# Patient Record
Sex: FEMALE | Race: WHITE | URBAN - METROPOLITAN AREA
[De-identification: names, ages, dates, MRNs, and addresses within clinical notes are randomized per-mention and may not be internally consistent; named-entity substitution may affect disease eponyms.]

---

## 2022-09-06 ENCOUNTER — OFFICE VISIT (OUTPATIENT)
Dept: UROGYNECOLOGY | Age: 47
End: 2022-09-06
Payer: MEDICAID

## 2022-09-06 VITALS
HEART RATE: 78 BPM | DIASTOLIC BLOOD PRESSURE: 84 MMHG | OXYGEN SATURATION: 99 % | RESPIRATION RATE: 16 BRPM | SYSTOLIC BLOOD PRESSURE: 126 MMHG | TEMPERATURE: 98 F

## 2022-09-06 DIAGNOSIS — R35.0 URINARY FREQUENCY: ICD-10-CM

## 2022-09-06 DIAGNOSIS — N39.3 STRESS INCONTINENCE: ICD-10-CM

## 2022-09-06 DIAGNOSIS — R39.15 URINARY URGENCY: ICD-10-CM

## 2022-09-06 DIAGNOSIS — N81.11 CYSTOCELE, MIDLINE: Primary | ICD-10-CM

## 2022-09-06 DIAGNOSIS — N81.9 VAGINAL VAULT PROLAPSE: ICD-10-CM

## 2022-09-06 DIAGNOSIS — N81.6 RECTOCELE: ICD-10-CM

## 2022-09-06 LAB
BILIRUBIN, POC: NORMAL
BLOOD URINE, POC: NORMAL
CLARITY, POC: CLEAR
COLOR, POC: YELLOW
EMPTY COUGH STRESS TEST: POSITIVE
FIRST SENSATION: 50 CC
FULL COUGH STRESS TEST: NORMAL
GLUCOSE URINE, POC: NORMAL
KETONES, POC: NORMAL
LEUKOCYTE EST, POC: NORMAL
MAX SENSATION: 325 CC
NITRATE, URINE POC: NORMAL
NITRITE, POC: NORMAL
PH, POC: 6.5
POST VOID RESIDUAL (PVR): 100 ML
PROTEIN, POC: NORMAL
RBC URINE, POC: NORMAL
SECOND SENSATION: 90 CC
SPASM: NORMAL
SPECIFIC GRAVITY, POC: 1.01
UROBILINOGEN, POC: NORMAL
WBC URINE, POC: NORMAL

## 2022-09-06 PROCEDURE — 99203 OFFICE O/P NEW LOW 30 MIN: CPT | Performed by: OBSTETRICS & GYNECOLOGY

## 2022-09-06 PROCEDURE — 81002 URINALYSIS NONAUTO W/O SCOPE: CPT | Performed by: OBSTETRICS & GYNECOLOGY

## 2022-09-06 PROCEDURE — 51725 SIMPLE CYSTOMETROGRAM: CPT | Performed by: OBSTETRICS & GYNECOLOGY

## 2022-09-06 NOTE — PROGRESS NOTES
9/6/2022      HPI:     Name: Brianna Lyn  YOB: 1975    CC: Patient is a 49 y.o. female who is seen in consultation from [address]   for evaluation of prolapse. HPI:  Bladder control problem: Yes   How many months have you had a bladder problem? 7 years  Do you use pads to absorb lost urine? No.   How many trips do you make to the bathroom during the day? 10  How many times do you wake at night to go to the bathroom? 2-3  Do you ever wet the bed while asleep? Yes  Are there times when you cannot make it to the bathroom on time? Yes  Does sound, sight, or feel of running water cause you to lose urine? No  How many ounces of liquid do you consume daily? 70  How many drinks containing caffeine do you consume daily? 2-3  Which best describes urine loss: (Check all that apply)  [x] I lose urine during coughing, sneezing, running, lifting  [] I lose urine with changes in posture, standing, walking  [] I lose urine continuously such that I am constantly wet  [] I have sudden, urgent needs without the ability to make it to the bathroom  Have you seen a physician for complaints of urine loss? No   Have you taken medication to prevent urine loss? No   Bladder emptying problems:  Yes  How long have you had bladder emptying problems? 2 years  Do you notice any dribbling of urine when you stand after passing urine? Yes  Do you usually have difficulty starting your urine stream? Yes  Do you have to assume abnormal positions to urinate? No   Do you have to strain to empty your bladder? No  Do you feel as if your bladder is empty after passing urine? No  Is your urine flow: intermittent   Prolapse/Vaginal Support problems: Yes   Do you notice a bulge? Yes  How long have you had a protrusion or bulge? 7 years  Are your symptoms worse at the end of the day or after for prolonged periods? Yes  Do you push the protrusion back to help with a bowel movement or to empty your bladder?  Yes  Have you ever used a pessary (plastic support device) for this problem? No     Bowel problem(s): No   Sexual History:  reports that she is not currently sexually active. Pelvic Pain:  Yes  Where is your pain? Lower Abdomen  How long have you had pelvic pain? 2 years  Is your pain relieved by bladder emptying? Yes  Do you have pain with urination? No  Does anything relieve the pain? Yes, n/a       Ob/Gyn History:    OB History    Para Term  AB Living   6 6 6     6   SAB IAB Ectopic Molar Multiple Live Births             6      # Outcome Date GA Lbr Ronnie/2nd Weight Sex Delivery Anes PTL Lv   6 Term      Vag-Spont   PERCY   5 Term      Vag-Spont   PERCY   4 Term      Vag-Spont   PERCY   3 Term      Vag-Spont   PERCY   2 Term      Vag-Spont   PERCY   1 Term      Vag-Spont   PERCY     Past Medical History:   Past Medical History:   Diagnosis Date    PMDD (premenstrual dysphoric disorder)     Seizure ([facility] [address]     X 2-last one as a teenager     Past Surgical History:   Past Surgical History:   Procedure Laterality Date    CERVIX SURGERY      \"freezing\"    HYSTERECTOMY (CERVIX STATUS UNKNOWN)  3-    VAGINAL HYSTERECTOMY, BILATERAL SAPINGECTOMY    LEEP       Allergies: No Known Allergies  Current Medications:  Current Outpatient Medications   Medication Sig Dispense Refill    FLUoxetine (PROZAC) 20 MG capsule Take 20 mg by mouth daily. ibuprofen (ADVIL;MOTRIN) 800 MG tablet Take 1 tablet by mouth every 6 hours. (Patient not taking: Reported on 2022) 60 tablet 1     No current facility-administered medications for this visit.      Social History:   Social History     Socioeconomic History    Marital status:      Spouse name: Not on file    Number of children: Not on file    Years of education: Not on file    Highest education level: Not on file   Occupational History    Not on file   Tobacco Use    Smoking status: Former     Types: Cigarettes     Quit date: 10/19/2013     Years since quittin.8    Smokeless tobacco: Never   Vaping Use    Vaping Use: Never used   Substance and Sexual Activity    Alcohol use: No    Drug use: No    Sexual activity: Not Currently   Other Topics Concern    Not on file   Social History Narrative    Not on file     Social Determinants of Health     Financial Resource Strain: Not on file   Food Insecurity: Not on file   Transportation Needs: Not on file   Physical Activity: Not on file   Stress: Not on file   Social Connections: Not on file   Intimate Partner Violence: Not on file   Housing Stability: Not on file     Family History:   Family History   Problem Relation Age of Onset    Other Father      Review of Systems:  Review of Systems   Genitourinary:  Positive for pelvic pain and vaginal pain. Allergic/Immunologic: Positive for environmental allergies. All other systems reviewed and are negative. Objective:     Vital Signs  Vitals:    09/06/22 1322 09/06/22 1324   BP: (!) 146/89 126/84   Pulse: 78    Resp: 16    Temp: 98 °F (36.7 °C)    SpO2: 99%      Physical Exam  Physical Exam  HENT:      Head: Normocephalic and atraumatic. Eyes:      Conjunctiva/sclera: Conjunctivae normal.   Pulmonary:      Effort: Pulmonary effort is normal.   Abdominal:      Palpations: Abdomen is soft. Genitourinary:     Comments: Very large complete vault prolapse cystocele and enterocele, extending 8 cm beyond the introitus  Musculoskeletal:      Cervical back: Normal range of motion and neck supple. Skin:     General: Skin is warm and dry. Neurological:      Mental Status: She is alert and oriented to person, place, and time. Office Fill Study/Urine Dip:     Using sterile technique a manometry catheter was placed. Patient's bladder was filled with sterile water by gravity. Capacity and storage volumes were measured. Spasms assessed. Catheter was removed. Stress urinary incontinence was assessed.     Results for POC orders placed in visit on 09/06/22   POCT Urinalysis no Micro Result Value Ref Range    Color, UA yellow     Clarity, UA clear     Glucose, UA POC neg     Bilirubin, UA neg     Ketones, UA neg     Spec Grav, UA 1.015     Blood, UA POC neg     pH, UA 6.5     Protein, UA POC neg     Urobilinogen, UA neg     Leukocytes, UA neg     Nitrite, UA neg        Cystometrogram   WBC Urine, POC   Date Value Ref Range Status   09/06/2022 neg  Final     RBC Urine, POC   Date Value Ref Range Status   09/06/2022 neg  Final     Nitrate, UA POC   Date Value Ref Range Status   09/06/2022 neg  Final     post void residual   Date Value Ref Range Status   09/06/2022 100 ml Final     FIRST SENSATION   Date Value Ref Range Status   09/06/2022 50 cc Final     SECOND SENSATION   Date Value Ref Range Status   09/06/2022 90 cc Final     MAX SENSATION   Date Value Ref Range Status   09/06/2022 325 cc Final     EMPTY COUGH STRESS TEST   Date Value Ref Range Status   09/06/2022 positive  Final     FULL COUGH STRESS TEST   Date Value Ref Range Status   09/06/2022 positive, supine - Negative, standing  Final     SPASM   Date Value Ref Range Status   09/06/2022 neg  Final        POPQ and Pelvic Exam:     Pelvic Organ Prolapse Quantification  Anterior Wall (Aa): +3 Anterior Wall (Ba): +8   Cervix or Cuff (C): 8     Genital Hiatus (gh): 6 Perineal Body (pb): 1   Total Vaginal Length (tvl): n/a     Posterior Wall (Ap): +3 Posterior Wall (Bp): +8   Posterior Fornix (D): n/a     Oxford Scale Muscle Strength: n/a     Assessment/Plan:     Charlie Glover is a 49 y.o. female with   1. Cystocele, midline    2. Rectocele    3. Urinary frequency    4. Urinary urgency    5. Vaginal vault prolapse    6. Stress incontinence      Old records reviewed, outside records reviewed, cystometrogram was reviewed  Indira Ferrera presents today with a chief complaint of pelvic organ prolapse and stress urinary incontinence. On examination she has a large enterocele and complete vault prolapse.   Because this is so large this would need to be fixed vaginally. She had a hysterectomy done previously. She did demonstrate the sign of stress incontinence today. I did draw a picture of her prolapse today as well as gave her handouts on both stress incontinence and pelvic organ prolapse. She is going to return for urodynamics testing as well as cystourethroscopy. Orders Placed This Encounter   Procedures    POCT Urinalysis no Micro    Cystometrogram       No orders of the defined types were placed in this encounter.       Sarina Holden MD

## 2022-09-30 ENCOUNTER — PROCEDURE VISIT (OUTPATIENT)
Dept: UROGYNECOLOGY | Age: 47
End: 2022-09-30
Payer: MEDICAID

## 2022-09-30 VITALS
TEMPERATURE: 98 F | DIASTOLIC BLOOD PRESSURE: 84 MMHG | OXYGEN SATURATION: 99 % | HEART RATE: 78 BPM | SYSTOLIC BLOOD PRESSURE: 126 MMHG | RESPIRATION RATE: 16 BRPM

## 2022-09-30 DIAGNOSIS — R35.0 URINARY FREQUENCY: Primary | ICD-10-CM

## 2022-09-30 DIAGNOSIS — R39.15 URINARY URGENCY: ICD-10-CM

## 2022-09-30 DIAGNOSIS — N81.11 CYSTOCELE, MIDLINE: ICD-10-CM

## 2022-09-30 DIAGNOSIS — N39.3 STRESS INCONTINENCE: ICD-10-CM

## 2022-09-30 DIAGNOSIS — N81.6 RECTOCELE: ICD-10-CM

## 2022-09-30 LAB
BILIRUBIN, POC: NORMAL
BLOOD URINE, POC: NORMAL
CLARITY, POC: CLEAR
COLOR, POC: YELLOW
GLUCOSE URINE, POC: NORMAL
KETONES, POC: NORMAL
LEUKOCYTE EST, POC: NORMAL
NITRITE, POC: NORMAL
PH, POC: 6.5
PROTEIN, POC: NORMAL
SPECIFIC GRAVITY, POC: 1.01
UROBILINOGEN, POC: NORMAL

## 2022-09-30 PROCEDURE — 51741 ELECTRO-UROFLOWMETRY FIRST: CPT | Performed by: OBSTETRICS & GYNECOLOGY

## 2022-09-30 PROCEDURE — 51797 INTRAABDOMINAL PRESSURE TEST: CPT | Performed by: OBSTETRICS & GYNECOLOGY

## 2022-09-30 PROCEDURE — 81002 URINALYSIS NONAUTO W/O SCOPE: CPT | Performed by: OBSTETRICS & GYNECOLOGY

## 2022-09-30 PROCEDURE — 51784 ANAL/URINARY MUSCLE STUDY: CPT | Performed by: OBSTETRICS & GYNECOLOGY

## 2022-09-30 PROCEDURE — 51729 CYSTOMETROGRAM W/VP&UP: CPT | Performed by: OBSTETRICS & GYNECOLOGY

## 2022-09-30 NOTE — PROGRESS NOTES
Urodynamic Procedure Note    [facility] Manual  1975    Urodynamicist: Dr. Sánchez Ramos    Equipment: Niall Arambula    Brief History/Indication:    Patient is a 49 y.o. female with subjective complaints of stress incontinence and prolapse and  urinary frequency  for a urodynamic evaluation. Cystometrogram   WBC Urine, POC   Date Value Ref Range Status   09/06/2022 neg  Final     RBC Urine, POC   Date Value Ref Range Status   09/06/2022 neg  Final     Nitrate, UA POC   Date Value Ref Range Status   09/06/2022 neg  Final     post void residual   Date Value Ref Range Status   09/06/2022 100 ml Final     FIRST SENSATION   Date Value Ref Range Status   09/06/2022 50 cc Final     SECOND SENSATION   Date Value Ref Range Status   09/06/2022 90 cc Final     MAX SENSATION   Date Value Ref Range Status   09/06/2022 325 cc Final     EMPTY COUGH STRESS TEST   Date Value Ref Range Status   09/06/2022 positive  Final     FULL COUGH STRESS TEST   Date Value Ref Range Status   09/06/2022 positive, supine - Negative, standing  Final     SPASM   Date Value Ref Range Status   09/06/2022 neg  Final       Pelvic Organ Prolapse Quantification  Anterior Wall (Aa): +3   Anterior Wall (Ba): +8   Cervix or Cuff (C): 8     Genital Hiatus (gh): 6   Perineal Body (pb): 1   Total Vaginal Length (tvl): n/a     Posterior Wall (Ap): +3   Posterior Wall (Bp): +8   Posterior Fornix (D): n/a     Oxford Scale Muscle Strength: n/a          Procedure: The Patient was taken to the Urodynamics suite and a free urine flow was obtained followed by catheterization for residual urine. A double-lumen urodynamic catheter was introduced to the bladder for measuring bladder pressure, and for filling. EMG patch electrodes were placed perianally for recording activity of the anal sphincter. A vaginal catheter was inserted to record the abdominal pressure. The entire process was displayed and analyzed using the [employer] Urodynamic machine and software.     Findings: Results for POC orders placed in visit on 09/30/22   POCT Urinalysis no Micro   Result Value Ref Range    Color, UA yellow     Clarity, UA clear     Glucose, UA POC neg     Bilirubin, UA neg     Ketones, UA neg     Spec Grav, UA 1.015     Blood, UA POC neg     pH, UA 6.5     Protein, UA POC neg     Urobilinogen, UA neg     Leukocytes, UA neg     Nitrite, UA neg        Uroflow:  Patient was able to void for Uroflow    Voided Volume: 149.9ml  PVR: 5ml  Max Flow: 13ml/sec with an average flow rate of 6.6ml/sec    CMG:  First sensation: 15ml  First desire: 57ml  Strong desire: 281ml  Max capacity: 284ml  Uninhibited detrusor contraction: No     Leak Point Pressures:  150ml with  negative  Sitting cough, negative  Sitting valsalva, with reduction    40 cm/H2O at 284ml (max) with positive  Sitting cough,  negative  Sitting valsalva, with reduction    Pressure voiding study:  Patient was able to void with catheters in place to obtain pressure voiding study    Maximum detrusor pressure at peak flow -44cm/H2O Peak flow rate 4.8ml/sec    Maximum abd pressure at peak flow 44cm/H2O Flow time 74sec    Maximum vesical pressure at peak flow 0cm/H2O    Voided volume 146.4ml and Residual: (Max cap-voided) 137ml    Patient also voided in toilet after procedure, unable to fully void with catheters in place    MUCP:  First Pull 72, unable to obtain second pull due to severe discomfort that patient was in.     EMG: does correlate    Assessment:  Clinical Diagnosis: Prolapse  RHODA  Empties well      Plan: cysto  Discuss at next visit: surgical consent with sling  Pasquale Cage MD

## 2022-10-11 ENCOUNTER — PROCEDURE VISIT (OUTPATIENT)
Dept: UROGYNECOLOGY | Age: 47
End: 2022-10-11
Payer: MEDICAID

## 2022-10-11 VITALS
HEART RATE: 63 BPM | OXYGEN SATURATION: 99 % | SYSTOLIC BLOOD PRESSURE: 155 MMHG | DIASTOLIC BLOOD PRESSURE: 101 MMHG | TEMPERATURE: 98.7 F | RESPIRATION RATE: 17 BRPM

## 2022-10-11 DIAGNOSIS — R35.0 URINARY FREQUENCY: Primary | ICD-10-CM

## 2022-10-11 DIAGNOSIS — N81.11 CYSTOCELE, MIDLINE: ICD-10-CM

## 2022-10-11 DIAGNOSIS — N81.5 VAGINAL ENTEROCELE: ICD-10-CM

## 2022-10-11 DIAGNOSIS — N81.9 VAGINAL VAULT PROLAPSE: ICD-10-CM

## 2022-10-11 DIAGNOSIS — R39.15 URINARY URGENCY: ICD-10-CM

## 2022-10-11 DIAGNOSIS — N81.6 RECTOCELE: ICD-10-CM

## 2022-10-11 DIAGNOSIS — N39.3 STRESS INCONTINENCE: ICD-10-CM

## 2022-10-11 PROCEDURE — 52285 CYSTOSCOPY AND TREATMENT: CPT | Performed by: OBSTETRICS & GYNECOLOGY

## 2022-10-11 PROCEDURE — 99214 OFFICE O/P EST MOD 30 MIN: CPT | Performed by: OBSTETRICS & GYNECOLOGY

## 2022-10-11 RX ORDER — SODIUM CHLORIDE 0.9 % (FLUSH) 0.9 %
5-40 SYRINGE (ML) INJECTION EVERY 12 HOURS SCHEDULED
OUTPATIENT
Start: 2022-10-11

## 2022-10-11 RX ORDER — SODIUM CHLORIDE 0.9 % (FLUSH) 0.9 %
5-40 SYRINGE (ML) INJECTION PRN
OUTPATIENT
Start: 2022-10-11

## 2022-10-11 RX ORDER — ENOXAPARIN SODIUM 100 MG/ML
40 INJECTION SUBCUTANEOUS ONCE
OUTPATIENT
Start: 2022-10-11 | End: 2022-10-11

## 2022-10-11 RX ORDER — SODIUM CHLORIDE 9 MG/ML
INJECTION, SOLUTION INTRAVENOUS PRN
OUTPATIENT
Start: 2022-10-11

## 2022-10-11 NOTE — PROGRESS NOTES
10/11/2022     HPI:     Name: Liu Moralez  YOB: 1975    CC: Liu Moralez is a 49 y.o. female presenting for an evaluation of prolapse and stress incontinence . HPI: How long have you had this problem? Please rate the severity of your problem: moderate  Anything make it better? Ob/Gyn History:    OB History    Para Term  AB Living   6 6 6     6   SAB IAB Ectopic Molar Multiple Live Births             6      # Outcome Date GA Lbr Ronnie/2nd Weight Sex Delivery Anes PTL Lv   6 Term      Vag-Spont   PERCY   5 Term      Vag-Spont   PERCY   4 Term      Vag-Spont   PERCY   3 Term      Vag-Spont   PERCY   2 Term      Vag-Spont   PERCY   1 Term      Vag-Spont   PERCY     Past Medical History:   Past Medical History:   Diagnosis Date    PMDD (premenstrual dysphoric disorder)     Seizure ([facility] [address]     X 2-last one as a teenager     Past Surgical History:   Past Surgical History:   Procedure Laterality Date    CERVIX SURGERY      \"freezing\"    HYSTERECTOMY (CERVIX STATUS UNKNOWN)  3-    VAGINAL HYSTERECTOMY, BILATERAL SAPINGECTOMY    LEEP       Current Medications:  Current Outpatient Medications   Medication Sig Dispense Refill    ibuprofen (ADVIL;MOTRIN) 800 MG tablet Take 1 tablet by mouth every 6 hours. 60 tablet 1    FLUoxetine (PROZAC) 20 MG capsule Take 20 mg by mouth daily. No current facility-administered medications for this visit. Allergies: No Known Allergies  Social History:   Social History     Socioeconomic History    Marital status:      Spouse name: Not on file    Number of children: Not on file    Years of education: Not on file    Highest education level: Not on file   Occupational History    Not on file   Tobacco Use    Smoking status: Former     Types: Cigarettes     Quit date: 10/19/2013     Years since quittin.9    Smokeless tobacco: Never   Vaping Use    Vaping Use: Never used   Substance and Sexual Activity    Alcohol use: No    Drug use:  No Sexual activity: Not Currently   Other Topics Concern    Not on file   Social History Narrative    Not on file     Social Determinants of Health     Financial Resource Strain: Not on file   Food Insecurity: Not on file   Transportation Needs: Not on file   Physical Activity: Not on file   Stress: Not on file   Social Connections: Not on file   Intimate Partner Violence: Not on file   Housing Stability: Not on file     Family History:   Family History   Problem Relation Age of Onset    Other Father          Objective:     Vital Signs  Vitals:    10/11/22 0926 10/11/22 0930   BP: (!) 153/93 (!) 155/101   Pulse: 63    Resp: 17    Temp: 98.7 °F (37.1 °C)    SpO2: 99%       Physical Exam  Physical Exam  HENT:      Head: Normocephalic and atraumatic. Eyes:      Conjunctiva/sclera: Conjunctivae normal.   Pulmonary:      Effort: Pulmonary effort is normal.   Abdominal:      Palpations: Abdomen is soft. Genitourinary:     Comments: Large complete vaginal vault prolapse  Musculoskeletal:      Cervical back: Normal range of motion and neck supple. Skin:     General: Skin is warm and dry. Neurological:      Mental Status: She is alert and oriented to person, place, and time. Procedure: The urethral was anesthesized with topical lidocaine jelly and dilated up to a #14 Afghan. A 0-degree urethroscope was used to examine the urethra. A 70-degree cystoscope was used to evaluate the bladder. FINDINGS:  1. Urethra was normal  2. Bladder had trabeculations mild  3. Trigone appeared Normal  4. Ureters illustrated bilateral efflux  5. Patient exhibited spasms during the study no    Cough stress test: Bladder filled to 250 mL. Patient did leak with cough stress test.    No results found for this visit on 10/11/22. Assessment/Plan:     Jen Chamberlain is a 49 y.o. female with   1. Urinary frequency    2. Urinary urgency    3. Cystocele, midline    4. Rectocele    5. Stress incontinence    6.  Vaginal vault prolapse 7. Vaginal enterocele    Old records reviewed, outside records reviewed, urodynamics reviewed, cystourethroscopy was reviewed    The patient was counseled on surgical and non-surgical options. The patient elected to move forward with surgery because of worsening symptoms. The risks and benefits of surgery including but not limited to bleeding, infection, injury to bowel, bladder, ureter, or other internal organs, transfusion, pain, bowel dysfunction, urinary incontinence, and sexual dysfunction were discussed at length. All questions were answered to the patients satisfaction. The patient elected to undergo bilateral salpingectomy, anterior repair, posterior repair, enterocele repair, vaginal vault suspension, synthetic mid-urethral sling, and cystourethroscopy. The risk and benefits of synthetic material, including mesh, were explained to the patient and all questions were answered. Preop orders were placed  Orders Placed This Encounter   Procedures    Initiate PAT Protocol     Standing Status:   Future     Standing Expiration Date:   12/10/2022    OR CYSTOSCOPY,RX FEMALE URETHRAL SYND       No orders of the defined types were placed in this encounter.       Deshawn Verduzco MD

## 2022-10-11 NOTE — LETTER
[facility]  [address] [place] [address] [place]  Phone: [phone]  Fax: [phone]    Pasquale Cage MD        October 11, 2022        Dear Dr. Yulissa Flynn,    I the pleasure of seeing Kaela Zhao back in the office today and consented her for surgery. Thank you for allowing me to participate in her care.       Sincerely,    Nora Osborne MD

## 2022-11-17 NOTE — PROGRESS NOTES
DOS 22   [birth year]    PATIENT SEEING DR. Anna ANTONIO  [address] ON 22 @ 10;30AM  FOR HER PRE-OP H&P PHONE #[phone]      UPDATE:Called Dr Phoebe Cruz office spoke with Mayra Ortiz , she will fax H&P to [facility] when completed. Await fax. Update: Called Dr Mirian Alvares office spoke with Lety Hinojosa , she will fax H&P. Await fax. Patient is cleared for surgery , will be scanned into MEDIA.     UPDATED:22:H&P SCANNED IN Epic UNDER MEDIA PATIENT CLEARED FOR  SURGERY

## 2022-11-17 NOTE — PROGRESS NOTES
[address] [facility] time_0825___________        Surgery time___1025_________    Take the following medications with a sip of water: Follow your MD/Surgeons pre-procedure instructions regarding your medications     Do not eat or drink anything after 12:00 midnight prior to your surgery. This includes water chewing gum, mints and ice chips. You may brush your teeth and gargle the morning of your surgery, but do not swallow the water     Please see your family doctor/pediatrician for a history and physical and/or concerning medications. Bring any test results/reports from your physicians office. If you are under the care of a heart doctor or specialist doctor, please be aware that you may be asked to them for clearance    You may be asked to stop blood thinners such as Coumadin, Plavix, Fragmin, Lovenox, etc., or any anti-inflammatories such as:  Aspirin, Ibuprofen, Advil, Naproxen prior to your surgery. MAY TAKE TYLENOL   We also ask that you stop any OTC medications such as fish oil, vitamin E, glucosamine, garlic, Multivitamins, COQ 10, etc.    We ask that you do not smoke 24 hours prior to surgery  We ask that you do not  drink any alcoholic beverages 24 hours prior to surgery     You must make arrangements for a responsible adult to take you home after your surgery. For your safety you will not be allowed to leave alone or drive yourself home. Your surgery will be cancelled if you do not have a ride home. Also for your safety, it is strongly suggested that someone stay with you the first 24 hours after your surgery. A parent or legal guardian must accompany a child scheduled for surgery and plan to stay at the hospital until the child is discharged. Please do not bring other children with you. For your comfort, please wear simple loose fitting clothing to the hospital.  Please do not bring valuables.     Do not wear any make-up or nail polish on your fingers or toes      For your safety, please do not wear any jewelry or body piercing's on the day of surgery. All jewelry must be removed. If you have dentures, they will be removed before going to operating room. For your convenience, we will provide you with a container. If you wear contact lenses or glasses, they will be removed, please bring a case for them. If you have a living will and a durable power of  for healthcare, please bring in a copy. As part of our patient safety program to minimize surgical site infections, we ask you to do the following:    Please notify your surgeon if you develop any illness between         now and the  day of your surgery. This includes a cough, cold, fever, sore throat, nausea,         or vomiting, and diarrhea, etc.   Please notify your surgeon if you experience dizziness, shortness         of breath or blurred vision between now and the time of your surgery. Do not shave your operative site 96 hours prior to surgery. For face and neck surgery, men may use an electric razor 48 hours   prior to surgery. You may shower the night before surgery or the morning of   your surgery with an antibacterial soap. You will need to bring a photo ID and insurance card    [facility] has an onsite pharmacy, would you like to utilize our pharmacy     If you will be staying overnight and use a C-pap machine, please bring   your C-pap to hospital     Our goal is to provide you with excellent care, therefore, visitors will be limited to two(2) in the room at a time so that we may focus on providing this care for you. Please contact pre-admission testing if you have any further questions.                  [facility] phone number:  [phone] [facility] fax number:  [phone]  Please note these are generalized instructions for all surgical cases, you may be provided with more specific instructions according to your surgery. C-Difficile admission screening and protocol:       * Admitted with diarrhea? [] YES    [x]  NO     *Prior history of C-Diff. In last 3 months? [] YES    [x]  NO     *Antibiotic use in the past 6-8 weeks? []  NO    []  YES                 If yes, which ANTIBIOTIC AND REASON______     *Prior hospitalization or nursing home in the last month? []  YES    [x]  NO        SAFETY FIRST. .call before you fall

## 2022-11-23 ENCOUNTER — ANESTHESIA EVENT (OUTPATIENT)
Dept: OPERATING ROOM | Age: 47
End: 2022-11-23
Payer: MEDICAID

## 2022-11-28 ENCOUNTER — ANESTHESIA (OUTPATIENT)
Dept: OPERATING ROOM | Age: 47
End: 2022-11-28
Payer: MEDICAID

## 2022-11-28 ENCOUNTER — HOSPITAL ENCOUNTER (OUTPATIENT)
Age: 47
Setting detail: OBSERVATION
Discharge: HOME | End: 2022-11-29
Attending: OBSTETRICS & GYNECOLOGY | Admitting: OBSTETRICS & GYNECOLOGY
Payer: MEDICAID

## 2022-11-28 DIAGNOSIS — N81.6 RECTOCELE: ICD-10-CM

## 2022-11-28 DIAGNOSIS — N81.11 CYSTOCELE, MIDLINE: ICD-10-CM

## 2022-11-28 DIAGNOSIS — N99.3 COMPLETE PROLAPSE OF VAGINAL VAULT: ICD-10-CM

## 2022-11-28 DIAGNOSIS — G89.18 POST-OP PAIN: Primary | ICD-10-CM

## 2022-11-28 PROCEDURE — 6360000002 HC RX W HCPCS: Performed by: NURSE ANESTHETIST, CERTIFIED REGISTERED

## 2022-11-28 PROCEDURE — G0378 HOSPITAL OBSERVATION PER HR: HCPCS

## 2022-11-28 PROCEDURE — 3600000015 HC SURGERY LEVEL 5 ADDTL 15MIN: Performed by: OBSTETRICS & GYNECOLOGY

## 2022-11-28 PROCEDURE — 2580000003 HC RX 258: Performed by: OBSTETRICS & GYNECOLOGY

## 2022-11-28 PROCEDURE — 2500000003 HC RX 250 WO HCPCS: Performed by: NURSE ANESTHETIST, CERTIFIED REGISTERED

## 2022-11-28 PROCEDURE — 7100000001 HC PACU RECOVERY - ADDTL 15 MIN: Performed by: OBSTETRICS & GYNECOLOGY

## 2022-11-28 PROCEDURE — 88302 TISSUE EXAM BY PATHOLOGIST: CPT

## 2022-11-28 PROCEDURE — 2709999900 HC NON-CHARGEABLE SUPPLY: Performed by: OBSTETRICS & GYNECOLOGY

## 2022-11-28 PROCEDURE — 2580000003 HC RX 258: Performed by: ANESTHESIOLOGY

## 2022-11-28 PROCEDURE — 96372 THER/PROPH/DIAG INJ SC/IM: CPT

## 2022-11-28 PROCEDURE — A4217 STERILE WATER/SALINE, 500 ML: HCPCS | Performed by: OBSTETRICS & GYNECOLOGY

## 2022-11-28 PROCEDURE — 1220000000 HC SEMI PRIVATE OB R&B

## 2022-11-28 PROCEDURE — 6360000002 HC RX W HCPCS: Performed by: ANESTHESIOLOGY

## 2022-11-28 PROCEDURE — 3700000000 HC ANESTHESIA ATTENDED CARE: Performed by: OBSTETRICS & GYNECOLOGY

## 2022-11-28 PROCEDURE — 6360000002 HC RX W HCPCS: Performed by: OBSTETRICS & GYNECOLOGY

## 2022-11-28 PROCEDURE — 3700000001 HC ADD 15 MINUTES (ANESTHESIA): Performed by: OBSTETRICS & GYNECOLOGY

## 2022-11-28 PROCEDURE — C1771 REP DEV, URINARY, W/SLING: HCPCS | Performed by: OBSTETRICS & GYNECOLOGY

## 2022-11-28 PROCEDURE — 57283 COLPOPEXY INTRAPERITONEAL: CPT | Performed by: OBSTETRICS & GYNECOLOGY

## 2022-11-28 PROCEDURE — 3600000005 HC SURGERY LEVEL 5 BASE: Performed by: OBSTETRICS & GYNECOLOGY

## 2022-11-28 PROCEDURE — 57288 REPAIR BLADDER DEFECT: CPT | Performed by: OBSTETRICS & GYNECOLOGY

## 2022-11-28 PROCEDURE — 2500000003 HC RX 250 WO HCPCS: Performed by: OBSTETRICS & GYNECOLOGY

## 2022-11-28 PROCEDURE — 57250 REPAIR RECTUM & VAGINA: CPT | Performed by: OBSTETRICS & GYNECOLOGY

## 2022-11-28 PROCEDURE — 57285 REPAIR PARAVAG DEFECT VAG: CPT | Performed by: OBSTETRICS & GYNECOLOGY

## 2022-11-28 PROCEDURE — 7100000000 HC PACU RECOVERY - FIRST 15 MIN: Performed by: OBSTETRICS & GYNECOLOGY

## 2022-11-28 DEVICE — SUPRAPUBIC MID-URETHRAL SLING
Type: IMPLANTABLE DEVICE | Site: PELVIS | Status: FUNCTIONAL
Brand: LYNX™ BLUE SYSTEM

## 2022-11-28 RX ORDER — PROPOFOL 10 MG/ML
INJECTION, EMULSION INTRAVENOUS PRN
Status: DISCONTINUED | OUTPATIENT
Start: 2022-11-28 | End: 2022-11-28 | Stop reason: SDUPTHER

## 2022-11-28 RX ORDER — EPHEDRINE SULFATE/0.9% NACL/PF 50 MG/5 ML
SYRINGE (ML) INTRAVENOUS PRN
Status: DISCONTINUED | OUTPATIENT
Start: 2022-11-28 | End: 2022-11-28 | Stop reason: SDUPTHER

## 2022-11-28 RX ORDER — FENTANYL CITRATE 50 UG/ML
INJECTION, SOLUTION INTRAMUSCULAR; INTRAVENOUS PRN
Status: DISCONTINUED | OUTPATIENT
Start: 2022-11-28 | End: 2022-11-28 | Stop reason: SDUPTHER

## 2022-11-28 RX ORDER — MIDAZOLAM HYDROCHLORIDE 1 MG/ML
INJECTION INTRAMUSCULAR; INTRAVENOUS PRN
Status: DISCONTINUED | OUTPATIENT
Start: 2022-11-28 | End: 2022-11-28 | Stop reason: SDUPTHER

## 2022-11-28 RX ORDER — ONDANSETRON 2 MG/ML
4 INJECTION INTRAMUSCULAR; INTRAVENOUS
Status: DISCONTINUED | OUTPATIENT
Start: 2022-11-28 | End: 2022-11-28 | Stop reason: HOSPADM

## 2022-11-28 RX ORDER — SUCCINYLCHOLINE/SOD CL,ISO/PF 200MG/10ML
SYRINGE (ML) INTRAVENOUS PRN
Status: DISCONTINUED | OUTPATIENT
Start: 2022-11-28 | End: 2022-11-28 | Stop reason: SDUPTHER

## 2022-11-28 RX ORDER — SODIUM CHLORIDE 0.9 % (FLUSH) 0.9 %
5-40 SYRINGE (ML) INJECTION PRN
Status: DISCONTINUED | OUTPATIENT
Start: 2022-11-28 | End: 2022-11-28 | Stop reason: SDUPTHER

## 2022-11-28 RX ORDER — ENOXAPARIN SODIUM 100 MG/ML
40 INJECTION SUBCUTANEOUS ONCE
Status: COMPLETED | OUTPATIENT
Start: 2022-11-28 | End: 2022-11-28

## 2022-11-28 RX ORDER — OXYCODONE HYDROCHLORIDE 10 MG/1
10 TABLET ORAL EVERY 4 HOURS PRN
Status: DISCONTINUED | OUTPATIENT
Start: 2022-11-28 | End: 2022-11-29 | Stop reason: HOSPADM

## 2022-11-28 RX ORDER — LIDOCAINE HYDROCHLORIDE 20 MG/ML
INJECTION, SOLUTION EPIDURAL; INFILTRATION; INTRACAUDAL; PERINEURAL PRN
Status: DISCONTINUED | OUTPATIENT
Start: 2022-11-28 | End: 2022-11-28 | Stop reason: SDUPTHER

## 2022-11-28 RX ORDER — SODIUM CHLORIDE 0.9 % (FLUSH) 0.9 %
5-40 SYRINGE (ML) INJECTION EVERY 12 HOURS SCHEDULED
Status: DISCONTINUED | OUTPATIENT
Start: 2022-11-28 | End: 2022-11-28 | Stop reason: HOSPADM

## 2022-11-28 RX ORDER — SODIUM CHLORIDE 9 MG/ML
INJECTION, SOLUTION INTRAVENOUS CONTINUOUS
Status: DISCONTINUED | OUTPATIENT
Start: 2022-11-28 | End: 2022-11-28 | Stop reason: HOSPADM

## 2022-11-28 RX ORDER — ROCURONIUM BROMIDE 10 MG/ML
INJECTION, SOLUTION INTRAVENOUS PRN
Status: DISCONTINUED | OUTPATIENT
Start: 2022-11-28 | End: 2022-11-28 | Stop reason: SDUPTHER

## 2022-11-28 RX ORDER — ONDANSETRON 2 MG/ML
INJECTION INTRAMUSCULAR; INTRAVENOUS PRN
Status: DISCONTINUED | OUTPATIENT
Start: 2022-11-28 | End: 2022-11-28 | Stop reason: SDUPTHER

## 2022-11-28 RX ORDER — PROMETHAZINE HYDROCHLORIDE 25 MG/1
12.5 TABLET ORAL EVERY 6 HOURS PRN
Status: DISCONTINUED | OUTPATIENT
Start: 2022-11-28 | End: 2022-11-29 | Stop reason: HOSPADM

## 2022-11-28 RX ORDER — SODIUM CHLORIDE 0.9 % (FLUSH) 0.9 %
5-40 SYRINGE (ML) INJECTION PRN
Status: DISCONTINUED | OUTPATIENT
Start: 2022-11-28 | End: 2022-11-28 | Stop reason: HOSPADM

## 2022-11-28 RX ORDER — APREPITANT 40 MG/1
CAPSULE ORAL
Status: DISPENSED
Start: 2022-11-28 | End: 2022-11-28

## 2022-11-28 RX ORDER — FLUOXETINE HYDROCHLORIDE 20 MG/1
20 CAPSULE ORAL DAILY
Status: DISCONTINUED | OUTPATIENT
Start: 2022-11-28 | End: 2022-11-29 | Stop reason: HOSPADM

## 2022-11-28 RX ORDER — OXYCODONE HYDROCHLORIDE 5 MG/1
5 TABLET ORAL EVERY 4 HOURS PRN
Status: DISCONTINUED | OUTPATIENT
Start: 2022-11-28 | End: 2022-11-29 | Stop reason: HOSPADM

## 2022-11-28 RX ORDER — MAGNESIUM HYDROXIDE 1200 MG/15ML
LIQUID ORAL CONTINUOUS PRN
Status: COMPLETED | OUTPATIENT
Start: 2022-11-28 | End: 2022-11-28

## 2022-11-28 RX ORDER — FENTANYL CITRATE 50 UG/ML
25 INJECTION, SOLUTION INTRAMUSCULAR; INTRAVENOUS EVERY 5 MIN PRN
Status: DISCONTINUED | OUTPATIENT
Start: 2022-11-28 | End: 2022-11-28 | Stop reason: HOSPADM

## 2022-11-28 RX ORDER — ACETAMINOPHEN 500 MG
TABLET ORAL
Status: DISCONTINUED
Start: 2022-11-28 | End: 2022-11-28

## 2022-11-28 RX ORDER — DEXAMETHASONE SODIUM PHOSPHATE 4 MG/ML
INJECTION, SOLUTION INTRA-ARTICULAR; INTRALESIONAL; INTRAMUSCULAR; INTRAVENOUS; SOFT TISSUE PRN
Status: DISCONTINUED | OUTPATIENT
Start: 2022-11-28 | End: 2022-11-28 | Stop reason: SDUPTHER

## 2022-11-28 RX ORDER — SODIUM CHLORIDE 9 MG/ML
INJECTION, SOLUTION INTRAVENOUS PRN
Status: DISCONTINUED | OUTPATIENT
Start: 2022-11-28 | End: 2022-11-28 | Stop reason: HOSPADM

## 2022-11-28 RX ORDER — SODIUM CHLORIDE 9 MG/ML
INJECTION, SOLUTION INTRAVENOUS CONTINUOUS
Status: DISCONTINUED | OUTPATIENT
Start: 2022-11-28 | End: 2022-11-29 | Stop reason: HOSPADM

## 2022-11-28 RX ORDER — SODIUM CHLORIDE 9 MG/ML
INJECTION, SOLUTION INTRAVENOUS PRN
Status: DISCONTINUED | OUTPATIENT
Start: 2022-11-28 | End: 2022-11-28 | Stop reason: SDUPTHER

## 2022-11-28 RX ORDER — FAMOTIDINE 10 MG/ML
INJECTION, SOLUTION INTRAVENOUS PRN
Status: DISCONTINUED | OUTPATIENT
Start: 2022-11-28 | End: 2022-11-28 | Stop reason: SDUPTHER

## 2022-11-28 RX ORDER — KETOROLAC TROMETHAMINE 30 MG/ML
INJECTION, SOLUTION INTRAMUSCULAR; INTRAVENOUS PRN
Status: DISCONTINUED | OUTPATIENT
Start: 2022-11-28 | End: 2022-11-28 | Stop reason: SDUPTHER

## 2022-11-28 RX ORDER — ONDANSETRON 2 MG/ML
4 INJECTION INTRAMUSCULAR; INTRAVENOUS EVERY 6 HOURS PRN
Status: DISCONTINUED | OUTPATIENT
Start: 2022-11-28 | End: 2022-11-29 | Stop reason: HOSPADM

## 2022-11-28 RX ORDER — SODIUM CHLORIDE 0.9 % (FLUSH) 0.9 %
5-40 SYRINGE (ML) INJECTION PRN
Status: DISCONTINUED | OUTPATIENT
Start: 2022-11-28 | End: 2022-11-29 | Stop reason: HOSPADM

## 2022-11-28 RX ORDER — SODIUM CHLORIDE 9 MG/ML
INJECTION, SOLUTION INTRAVENOUS PRN
Status: DISCONTINUED | OUTPATIENT
Start: 2022-11-28 | End: 2022-11-29 | Stop reason: HOSPADM

## 2022-11-28 RX ORDER — KETOROLAC TROMETHAMINE 30 MG/ML
30 INJECTION, SOLUTION INTRAMUSCULAR; INTRAVENOUS EVERY 6 HOURS
Status: DISCONTINUED | OUTPATIENT
Start: 2022-11-28 | End: 2022-11-29 | Stop reason: HOSPADM

## 2022-11-28 RX ORDER — SIMETHICONE 80 MG
160 TABLET,CHEWABLE ORAL 4 TIMES DAILY PRN
Status: DISCONTINUED | OUTPATIENT
Start: 2022-11-28 | End: 2022-11-29 | Stop reason: HOSPADM

## 2022-11-28 RX ORDER — SODIUM CHLORIDE 0.9 % (FLUSH) 0.9 %
5-40 SYRINGE (ML) INJECTION EVERY 12 HOURS SCHEDULED
Status: DISCONTINUED | OUTPATIENT
Start: 2022-11-28 | End: 2022-11-29 | Stop reason: HOSPADM

## 2022-11-28 RX ORDER — SODIUM CHLORIDE 0.9 % (FLUSH) 0.9 %
5-40 SYRINGE (ML) INJECTION EVERY 12 HOURS SCHEDULED
Status: DISCONTINUED | OUTPATIENT
Start: 2022-11-28 | End: 2022-11-28 | Stop reason: SDUPTHER

## 2022-11-28 RX ADMIN — MIDAZOLAM 2 MG: 1 INJECTION INTRAMUSCULAR; INTRAVENOUS at 10:06

## 2022-11-28 RX ADMIN — HYDROMORPHONE HYDROCHLORIDE 0.25 MG: 1 INJECTION, SOLUTION INTRAMUSCULAR; INTRAVENOUS; SUBCUTANEOUS at 11:46

## 2022-11-28 RX ADMIN — KETOROLAC TROMETHAMINE 30 MG: 30 INJECTION, SOLUTION INTRAMUSCULAR at 12:04

## 2022-11-28 RX ADMIN — FENTANYL CITRATE 50 MCG: 50 INJECTION INTRAMUSCULAR; INTRAVENOUS at 10:12

## 2022-11-28 RX ADMIN — ROCURONIUM BROMIDE 10 MG: 10 INJECTION, SOLUTION INTRAVENOUS at 11:29

## 2022-11-28 RX ADMIN — FENTANYL CITRATE 25 MCG: 50 INJECTION INTRAMUSCULAR; INTRAVENOUS at 12:06

## 2022-11-28 RX ADMIN — Medication 10 MG: at 11:11

## 2022-11-28 RX ADMIN — FAMOTIDINE 20 MG: 10 INJECTION, SOLUTION INTRAVENOUS at 10:24

## 2022-11-28 RX ADMIN — SODIUM CHLORIDE: 9 INJECTION, SOLUTION INTRAVENOUS at 09:33

## 2022-11-28 RX ADMIN — PROPOFOL 140 MG: 10 INJECTION, EMULSION INTRAVENOUS at 10:12

## 2022-11-28 RX ADMIN — SUGAMMADEX 200 MG: 100 INJECTION, SOLUTION INTRAVENOUS at 12:04

## 2022-11-28 RX ADMIN — HYDROMORPHONE HYDROCHLORIDE 0.25 MG: 1 INJECTION, SOLUTION INTRAMUSCULAR; INTRAVENOUS; SUBCUTANEOUS at 10:37

## 2022-11-28 RX ADMIN — SODIUM CHLORIDE: 9 INJECTION, SOLUTION INTRAVENOUS at 16:05

## 2022-11-28 RX ADMIN — DEXAMETHASONE SODIUM PHOSPHATE 10 MG: 4 INJECTION, SOLUTION INTRAMUSCULAR; INTRAVENOUS at 10:24

## 2022-11-28 RX ADMIN — ROCURONIUM BROMIDE 45 MG: 10 INJECTION, SOLUTION INTRAVENOUS at 10:17

## 2022-11-28 RX ADMIN — ROCURONIUM BROMIDE 5 MG: 10 INJECTION, SOLUTION INTRAVENOUS at 10:12

## 2022-11-28 RX ADMIN — FENTANYL CITRATE 25 MCG: 50 INJECTION, SOLUTION INTRAMUSCULAR; INTRAVENOUS at 13:04

## 2022-11-28 RX ADMIN — LIDOCAINE HYDROCHLORIDE 60 MG: 20 INJECTION, SOLUTION EPIDURAL; INFILTRATION; INTRACAUDAL; PERINEURAL at 10:12

## 2022-11-28 RX ADMIN — CEFAZOLIN 2000 MG: 2 INJECTION, POWDER, FOR SOLUTION INTRAMUSCULAR; INTRAVENOUS at 10:14

## 2022-11-28 RX ADMIN — ROCURONIUM BROMIDE 20 MG: 10 INJECTION, SOLUTION INTRAVENOUS at 11:03

## 2022-11-28 RX ADMIN — ONDANSETRON 4 MG: 2 INJECTION INTRAMUSCULAR; INTRAVENOUS at 11:55

## 2022-11-28 RX ADMIN — HYDROMORPHONE HYDROCHLORIDE 0.25 MG: 1 INJECTION, SOLUTION INTRAMUSCULAR; INTRAVENOUS; SUBCUTANEOUS at 11:27

## 2022-11-28 RX ADMIN — Medication 120 MG: at 10:12

## 2022-11-28 RX ADMIN — HYDROMORPHONE HYDROCHLORIDE 0.25 MG: 1 INJECTION, SOLUTION INTRAMUSCULAR; INTRAVENOUS; SUBCUTANEOUS at 11:55

## 2022-11-28 RX ADMIN — KETOROLAC TROMETHAMINE 30 MG: 30 INJECTION, SOLUTION INTRAMUSCULAR; INTRAVENOUS at 17:47

## 2022-11-28 RX ADMIN — FLUORESCEIN SODIUM 10 MG: 100 INJECTION INTRAVENOUS at 11:38

## 2022-11-28 RX ADMIN — FENTANYL CITRATE 25 MCG: 50 INJECTION, SOLUTION INTRAMUSCULAR; INTRAVENOUS at 12:54

## 2022-11-28 RX ADMIN — ENOXAPARIN SODIUM 40 MG: 100 INJECTION SUBCUTANEOUS at 09:26

## 2022-11-28 ASSESSMENT — PAIN DESCRIPTION - ONSET
ONSET: ON-GOING
ONSET: ON-GOING

## 2022-11-28 ASSESSMENT — PAIN DESCRIPTION - PAIN TYPE
TYPE: SURGICAL PAIN
TYPE: SURGICAL PAIN

## 2022-11-28 ASSESSMENT — PAIN - FUNCTIONAL ASSESSMENT
PAIN_FUNCTIONAL_ASSESSMENT: ACTIVITIES ARE NOT PREVENTED
PAIN_FUNCTIONAL_ASSESSMENT: ACTIVITIES ARE NOT PREVENTED
PAIN_FUNCTIONAL_ASSESSMENT: PREVENTS OR INTERFERES SOME ACTIVE ACTIVITIES AND ADLS

## 2022-11-28 ASSESSMENT — PAIN DESCRIPTION - ORIENTATION
ORIENTATION: LOWER
ORIENTATION: MID
ORIENTATION: MID

## 2022-11-28 ASSESSMENT — PAIN SCALES - GENERAL
PAINLEVEL_OUTOF10: 0
PAINLEVEL_OUTOF10: 5
PAINLEVEL_OUTOF10: 3
PAINLEVEL_OUTOF10: 2

## 2022-11-28 ASSESSMENT — PAIN DESCRIPTION - DESCRIPTORS: DESCRIPTORS: DULL;DISCOMFORT

## 2022-11-28 ASSESSMENT — PAIN DESCRIPTION - FREQUENCY
FREQUENCY: CONTINUOUS
FREQUENCY: CONTINUOUS

## 2022-11-28 ASSESSMENT — PAIN DESCRIPTION - LOCATION
LOCATION: ABDOMEN
LOCATION: VAGINA
LOCATION: ABDOMEN

## 2022-11-28 ASSESSMENT — ENCOUNTER SYMPTOMS: SHORTNESS OF BREATH: 0

## 2022-11-28 NOTE — PROGRESS NOTES
PACU Transfer Note    Vitals:    11/28/22 1315   BP: (!) 98/57   Pulse: 76   Resp: 12   Temp: 97.2 °F (36.2 °C)   SpO2: 99%       In: 550 [I.V.:500]  Out: 220     Pain assessment:  present - adequately treated  Pain Level: 2  Dressing is clean, dry, and intact. Malone in place.      Report given to Receiving unit RN.    11/28/2022 1:20 PM

## 2022-11-28 NOTE — PROGRESS NOTES
Patient admitted to PACU # 12 from OR at 1215 post  [address] [place] [address], RETROPUBIC SLING (Vagina )       CYSTOSCOPY  per Dr. Bora Huerta. Attached to PACU monitoring system and report received from anesthesia provider. Patient was reported to be hemodynamically stable during procedure. Patient drowsy on admission and denied pain.

## 2022-11-28 NOTE — H&P
Date of Surgery Update:  Hellen Gotti was seen, history and physical examination reviewed, and patient examined by me today. There have been no significant clinical changes since the completion of the previous history and physical. The surgical site was confirmed by the patient and me. I have presented reasonable alternatives to the patient's proposed care, treatment, and services. The discussion I have done encompassed risks, benefits, and side effects related to the alternatives and the risks related to not receiving the proposed care, treatment, and services. All questions answered. Patient wishes to proceed.      Electronically signed by: Omari Fletcher MD,11/28/2022,9:29 AM

## 2022-11-28 NOTE — ANESTHESIA POSTPROCEDURE EVALUATION
[facility] Department of Anesthesiology  Post-Anesthesia Note       Name:  Argentina Keyes                                  Age:  49 y.o. MRN:  [id number]     Last Vitals & Oxygen Saturation: BP (!) 94/58   Pulse 76   Temp 98.4 °F (36.9 °C) (Axillary)   Resp 16   Ht 5' 4\" (1.626 m)   Wt 173 lb 13.3 oz (78.8 kg)   LMP 02/09/2014   SpO2 95%   BMI 29.84 kg/m²   Patient Vitals for the past 4 hrs:   BP Temp Temp src Pulse Resp SpO2   11/28/22 1332 (!) 94/58 98.4 °F (36.9 °C) Axillary 76 16 95 %   11/28/22 1315 (!) 98/57 97.2 °F (36.2 °C) -- 76 12 99 %   11/28/22 1300 (!) 95/56 -- -- 74 11 96 %   11/28/22 1245 (!) 103/52 -- -- 77 12 92 %   11/28/22 1235 (!) 102/56 -- -- 79 16 94 %   11/28/22 1230 (!) 102/55 -- -- 76 (!) 9 93 %   11/28/22 1225 103/60 -- -- 78 10 91 %   11/28/22 1220 (!) 106/57 -- -- 79 11 (!) 89 %   11/28/22 1215 (!) 113/51 97.7 °F (36.5 °C) Temporal 83 11 95 %       Level of consciousness:  Awake, alert    Respiratory: Respirations easy, no distress. Stable. Cardiovascular: Hemodynamically stable. Hydration: Adequate. PONV: Adequately managed. Post-op pain: Adequately controlled. Post-op assessment: Tolerated anesthetic well without complication. Complications:  None.     Win Carvajal MD  November 28, 2022   3:27 PM

## 2022-11-28 NOTE — PLAN OF CARE
Problem: Discharge Planning  Goal: Discharge to home or other facility with appropriate resources  Outcome: Progressing     Problem: ABCDS Injury Assessment  Goal: Absence of physical injury  Outcome: Progressing     Problem: Pain  Goal: Verbalizes/displays adequate comfort level or baseline comfort level  Outcome: Progressing     Problem: Neurosensory - Adult  Goal: Absence of seizures  Outcome: Progressing  Goal: Remains free of injury related to seizures activity  Outcome: Progressing  Goal: Achieves maximal functionality and self care  Outcome: Progressing     Problem: Respiratory - Adult  Goal: Achieves optimal ventilation and oxygenation  Outcome: Progressing     Problem: Cardiovascular - Adult  Goal: Maintains optimal cardiac output and hemodynamic stability  Outcome: Progressing  Goal: Absence of cardiac dysrhythmias or at baseline  Outcome: Progressing     Problem: Skin/Tissue Integrity - Adult  Goal: Skin integrity remains intact  Outcome: Progressing  Goal: Incisions, wounds, or drain sites healing without S/S of infection  Outcome: Progressing  Goal: Oral mucous membranes remain intact  Outcome: Progressing     Problem: Musculoskeletal - Adult  Goal: Return mobility to safest level of function  Outcome: Progressing  Goal: Maintain proper alignment of affected body part  Outcome: Progressing  Goal: Return ADL status to a safe level of function  Outcome: Progressing     Problem: Gastrointestinal - Adult  Goal: Minimal or absence of nausea and vomiting  Outcome: Progressing  Goal: Maintains or returns to baseline bowel function  Outcome: Progressing  Goal: Maintains adequate nutritional intake  Outcome: Progressing  Goal: Establish and maintain optimal ostomy function  Outcome: Progressing     Problem: Genitourinary - Adult  Goal: Absence of urinary retention  Outcome: Progressing  Goal: Urinary catheter remains patent  Outcome: Progressing     Problem: Infection - Adult  Goal: Absence of infection at discharge  Outcome: Progressing  Goal: Absence of infection during hospitalization  Outcome: Progressing  Goal: Absence of fever/infection during anticipated neutropenic period  Outcome: Progressing     Problem: Metabolic/Fluid and Electrolytes - Adult  Goal: Electrolytes maintained within normal limits  Outcome: Progressing  Goal: Hemodynamic stability and optimal renal function maintained  Outcome: Progressing  Goal: Glucose maintained within prescribed range  Outcome: Progressing     Problem: Hematologic - Adult  Goal: Maintains hematologic stability  Outcome: Progressing

## 2022-11-28 NOTE — FLOWSHEET NOTE
Pt assisted to side of bed, dangled feet, ashley care, panties placed, assisted to chair, pt states pain 3/10, comfortable, call light in lap, denies any needs

## 2022-11-28 NOTE — BRIEF OP NOTE
Brief Postoperative Note      Patient: Kevin Mcdowell  YOB: 1975  MRN: [id number]    Date of Procedure: 11/28/2022    Pre-Op Diagnosis: VAGINAL VAULT PROLAPSE, CYSTOCELE, RECTOCELE    Post-Op Diagnosis: Same       Procedure(s):  VAGINAL VAULT SUSPENSION,ANTERIOR REPAIR, POSTERIOR REPAIR, RETROPUBIC SLING  CYSTOSCOPY    Surgeon(s):  Estelita Kaplan MD    Assistant:  Surgical Assistant: Arlin Mitchell    Anesthesia: General    Estimated Blood Loss (mL): 560    Complications: None    Specimens:   ID Type Source Tests Collected by Time Destination   A : a) excess vaginal tissue Tissue Tissue SURGICAL PATHOLOGY Estelita Kaplan MD 11/28/2022 1052        Implants:  Implant Name Type Inv.  Item Serial No.  Lot No. LRB No. Used Action   SYSTEM SLNG ZINA SUPRPUB MID Rosea Olszewski - [id number]  SYSTEM SLNG ZINA SUPRPUB MID Lindsay Flitto- [id number] N/A 1 Implanted         Drains:   Urinary Catheter 11/28/22 2 Way (Active)       Findings: very large vault prolapse, 8cm beyond introitus, significant varicosities    Electronically signed by Chicho Beasley MD on 11/28/2022 at 12:07 PM

## 2022-11-28 NOTE — PROGRESS NOTES
Vaginal Sweep Documentation     Vaginal prep sponge count performed by Ariel Garces RN and Nika CUMMINS RN Count correct. Vaginal sweep performed by Dr. Dicky Phoenix at 2101. No vaginal tears noted.   2\" iodoform packing placed in vagina

## 2022-11-28 NOTE — OP NOTE
Operative Note      Patient: Genaro Fox  YOB: 1975  MRN: [id number]    Date of Procedure: 11/28/2022    Pre-Op Diagnosis: VAGINAL VAULT PROLAPSE, CYSTOCELE, RECTOCELE    Post-Op Diagnosis: Same       Procedures: Procedure(s):  VAGINAL VAULT [address], [address]  CYSTOSCOPY     Surgeon: Surgeon(s):  Kaelyn Machuca MD    Anesthesia: General    Assistant: Surgical Assistant: Felicity Betancur    Estimated Blood Loss (mL): 220 mL    IV FLUIDS: 1500 milliliter(s) In: 50   Out: 220     URINE OUTPUT: 120 milliters(s)   Output by Drain (mL) 11/26/22 0701 - 11/26/22 1900 11/26/22 1901 - 11/27/22 0700 11/27/22 0701 - 11/27/22 1900 11/27/22 1901 - 11/28/22 0700 11/28/22 0701 - 11/28/22 1208   Requested LDAs do not have output data documented. Complications: None    Specimens:   ID Type Source Tests Collected by Time Destination   A : a) excess vaginal tissue Tissue Tissue SURGICAL PATHOLOGY Kaelyn Machuca MD 11/28/2022 1052        Implants:   Implant Name Type Inv. Item Serial No.  Lot No. LRB No. Used Action   SYSTEM SLNG ZINA SUPRPUB MID [facility] - [id number]  SYSTEM SLNG ZINA SUPRPUB MID [facility]- [id number] N/A 1 Implanted         Drains:   Urinary Catheter 11/28/22 2 Way (Active)       FINDINGS: Stage 4 prolapse. Normal appearing bladder and urethra without injury. Strong efflux noted from bilateral ureteral orifices. Large varicosities, prolapse 8cm beyond the introitus    INDICATION FOR PROCEDURE: 49y.o. year old patient who presented with symptomatic pelvic organ prolapse and incomplete bladder emptying. She had previously had a hysterectomy. On exam she had stage 4 prolapse. She desired to proceed with appropriate surgical repairs.   She was consented to the risks, including bleeding, risk of blood transfusion, infection, injury to surrounding organs such as bowel, bladder, ureters, urethra, the risk of postoperative voiding dysfunction or defecatory dysfunction, risk of nerve injury or re-exploration, the risk of recurrent prolapse or incontinence, and the risk of mesh erosion . The patient understood all of these risks and desired to proceed. OPERATIVE NOTE:   The patient was taken to the operating room and general anesthesia was found to be adequate. She was prepped and draped in the normal sterile fashion and placed in [address] [place] [address]. Preoperative antibiotics were administered in the patient holding area. The bladder was drained with a Malone catheter. DISSECTION: The most distal portion of the prolapse was identified. An allis clamp was placed midline at this point followed by other clamps in the midline moving cephalad. The vaginal wall was then injected with 1% lidocaine with epinephrine along the midline of the prolapse. A knife was used to make an incision in the vaginal mucosa. Clamps were placed along this incision bilaterally and the mucosa was then dissected off the fibromuscular layer. The rectum, enterocele and bladder were carefully identified during the dissection. The enterocele sac was carefully identified and sharply entered. It was very inferior and small as most of the prolapse was the bladder. The Renetta retractor was placed anteriorly. There was bleeding from very large varicosities that required multiple figure of eight sutures to control. VAGINAL VAULT SUSPENSION: A moist laparotomy sponge was then placed in the cul-de-sac. The uterosacral ligaments were palpated on both sides and Allis clamps were placed on the ligaments. One sutures of 0 PDS were placed through the each uterosacral ligament and tagged to be later brought through the vaginal mucosa. The laparotomy sponges were removed and the preliminary sponge count was correct. PARAVAGINAL REPAIR:  At this time, the cystocele was grasped using Allis clamps and injected with 1% lidocaine with epinephrine. A midline incision was then made with a scalpel and the bladder dissected free from the vaginal mucosa by means of sharp dissection. This dissection was taken out laterally until the inferior pubic rami. The cystocele was then plicated in two layers using 2-0 Vicryl followed by 2-0 Ethibond in an interrupted fashion. Bilateral paravaginal defects were repaired with 2-0 Ethibond suture in a two point fashion. Excess vaginal tissue was then excised multiple times and sent to pathology. It took an extra 40 minutes to do the dissection and repair because of the very large prolapse. The vault sutures were brought through the vaginal mucosa into the vagina. The anterior vaginal wall and cuff was closed using a continuous, running, locked 2-0 Vicryl suture leaving a small area open for the sling. The vaginal vault suspension sutures were then elevated and tied with good elevation and length of the vagina. RETROPUBIC SUBURETHRAL SLING: The bladder was completely drained using the Malone catheter. Using the previously made incision, the lateral edges were grasped with Allis clamps. Two tunnels were developed bilaterally until the pubic rami were palpated. Areas on the skin were marked 2 cm lateral to the midline above the symphysis pubis. These areas were injected with 1% lidocaine with epinephrine and incisions were made with a scalpel over the marks. The trocars were then placed bilaterally and brought through the vaginal tunnel with one finger in the vaginal tunnel to guide the trocars. CYSTOURETHROSCOPY: A cystourethroscopy was performed with the trocars in place to confirm no injury to the bladder or urethra. The mesh was attached to the trocars and brought through the tunnels. The suburethral mesh was placed tension-free in the midurethral segment. The mesh was tensioned using a right angle clamp and Crede maneuver. The plastic sheath was removed. The sling was cut off at the skin.  The skin was closed with dermabond. The vaginal incision was closed with 3-0 Vicryl in a running, locked fashion. RECTOCELE REPAIR AND PERINEORRHAPHY:  At t this time, after injection with the lidocaine mixture, a meena-shaped wedge of tissue was taken from the posterior perineum and vagina after two Allis clamps were placed on the posterior forchette in the appropriate position to ensure that the vaginal introitus was the appropriate size. With one finger in the rectum, the rectocele was then dissected free of the vaginal mucosa by  means of sharp dissection. The rectocele was then plicated using 2-0 Vicryl suture in an interrupted fashion. The posterior vaginal mucosa was then closed using 3-0 Vicryl in a running fashion and the perineum closed in a subcuticular fashion. The tails of the vaginal vault sutures were trimmed in the vagina. Iodiform vaginal packing was placed in the vagina and the Malone catheter was left in place. Sponge, lap and needle counts were correct x 2. There were no complications. The patient tolerated the procedure well and was taken to the recovery room in stable condition.         Electronically signed by Nyoka Jeans, MD on 11/28/2022 at 12:08 PM

## 2022-11-28 NOTE — ANESTHESIA PRE PROCEDURE
Department of Anesthesiology  Preprocedure Note       Name:  Lisa Sanchez   Age:  49 y.o.  :  1975                                          MRN:  [id number]         Date:  2022      Surgeon: Ever Coppola):  Jennifer Ko MD    Procedure: Procedure(s):  VAGINAL VAULT SUSPENSION  ANTERIOR REPAIR, POSTERIOR REPAIR, SALPINGECTOMY BILATERAL  RETROPUBIC SLING  CYSTOSCOPY    Medications prior to admission:   Prior to Admission medications    Medication Sig Start Date End Date Taking? Authorizing Provider   ibuprofen (ADVIL;MOTRIN) 800 MG tablet Take 1 tablet by mouth every 6 hours. 3/5/14   Shadi Lepe MD   FLUoxetine (PROZAC) 20 MG capsule Take 20 mg by mouth daily. Historical Provider, MD       Current medications:    Current Facility-Administered Medications   Medication Dose Route Frequency Provider Last Rate Last Admin    0.9 % sodium chloride infusion   IntraVENous Continuous Aly Ricardo MD        sodium chloride flush 0.9 % injection 5-40 mL  5-40 mL IntraVENous 2 times per day Aly Ricardo MD        sodium chloride flush 0.9 % injection 5-40 mL  5-40 mL IntraVENous PRN Aly Ricardo MD        0.9 % sodium chloride infusion   IntraVENous PRN Aly Ricardo MD        ceFAZolin (ANCEF) 2,000 mg in dextrose 5 % 50 mL IVPB (mini-bag)  2,000 mg IntraVENous On Call to Michael Perkins MD        enoxaparin (LOVENOX) injection 40 mg  40 mg SubCUTAneous Once Jennifer Ko MD           Allergies:  No Known Allergies    Problem List:  There is no problem list on file for this patient.       Past Medical History:        Diagnosis Date    Anxiety     Hyperlipidemia     DIET CONTROL NO MEDS    PMDD (premenstrual dysphoric disorder)     Seizure ([facility] [address]     X 2-last one as a teenager       Past Surgical History:        Procedure Laterality Date    CERVIX SURGERY      \"freezing\"-LOCAL    HYSTERECTOMY (CERVIX STATUS UNKNOWN)  2014    VAGINAL HYSTERECTOMY, BILATERAL SAPINGECTOMY  KEENAN  2013       Social History:    Social History     Tobacco Use    Smoking status: Former     Packs/day: 0.50     Years: 5.00     Pack years: 2.50     Types: Cigarettes     Quit date: 10/19/2013     Years since quittin.1    Smokeless tobacco: Never   Substance Use Topics    Alcohol use: Yes     Comment: OCCAS                                Counseling given: Not Answered      Vital Signs (Current):   Vitals:    22 0935 22 0845 22 0900   BP:   134/70   Pulse:   (!) 101   Resp:   16   Temp:   97.1 °F (36.2 °C)   TempSrc:   Temporal   SpO2:   95%   Weight: 185 lb (83.9 kg) 173 lb 13.3 oz (78.8 kg)    Height: 5' 4\" (1.626 m) 5' 4\" (1.626 m)                                               BP Readings from Last 3 Encounters:   22 134/70   10/11/22 (!) 155/101   22 126/84       NPO Status: Time of last liquid consumption:                         Time of last solid consumption:                         Date of last liquid consumption: 22                        Date of last solid food consumption: 22    BMI:   Wt Readings from Last 3 Encounters:   22 173 lb 13.3 oz (78.8 kg)   14 170 lb (77.1 kg)   13 165 lb (74.8 kg)     Body mass index is 29.84 kg/m². CBC:   Lab Results   Component Value Date/Time    WBC 16.4 2014 05:32 AM    RBC 4.51 2014 05:32 AM    HGB 12.5 2014 05:32 AM    HCT 37.7 2014 05:32 AM    MCV 83.5 2014 05:32 AM    RDW 13.2 2014 05:32 AM     2014 05:32 AM       CMP: No results found for: NA, K, CL, CO2, BUN, CREATININE, GFRAA, AGRATIO, LABGLOM, GLUCOSE, GLU, PROT, CALCIUM, BILITOT, ALKPHOS, AST, ALT    POC Tests: No results for input(s): POCGLU, POCNA, POCK, POCCL, POCBUN, POCHEMO, POCHCT in the last 72 hours.     Coags: No results found for: PROTIME, INR, APTT    HCG (If Applicable):   Lab Results   Component Value Date    PREGTESTUR Negative 2014        ABGs: No results found for: PHART, PO2ART, NZX0LIK, RDW0VLH, BEART, S8WUQDIQ     Type & Screen (If Applicable):  No results found for: LABABO, LABRH    Drug/Infectious Status (If Applicable):  No results found for: HIV, HEPCAB    COVID-19 Screening (If Applicable): No results found for: COVID19        Anesthesia Evaluation  Patient summary reviewed no history of anesthetic complications:   Airway: Mallampati: II  TM distance: >3 FB   Neck ROM: full  Mouth opening: > = 3 FB   Dental:      Comment: No loose teeth    Pulmonary: breath sounds clear to auscultation      (-) COPD, asthma, shortness of breath, recent URI and sleep apnea                           Cardiovascular:    (+) hyperlipidemia    (-) hypertension, valvular problems/murmurs, past MI, CAD, CABG/stent, dysrhythmias,  angina,  CHF and no pulmonary hypertension      Rhythm: regular  Rate: normal                    Neuro/Psych:   (+) seizures:, depression/anxiety    (-) neuromuscular disease, TIA and CVA           GI/Hepatic/Renal:        (-) GERD, PUD, hepatitis, liver disease, no renal disease and bowel prep       Endo/Other:        (-) diabetes mellitus, hypothyroidism, hyperthyroidism, blood dyscrasia, arthritis               Abdominal:             Vascular: Other Findings:           Anesthesia Plan      general     ASA 2       Induction: intravenous. MIPS: Postoperative opioids intended and Prophylactic antiemetics administered. Anesthetic plan and risks discussed with patient and child/children. Plan discussed with CRNA. This pre-anesthesia assessment may be used as a history and physical.    DOS STAFF ADDENDUM:    Pt seen and examined, chart reviewed (including anesthesia, drug and allergy history). No interval changes to history and physical examination. Anesthetic plan, risks, benefits, alternatives, and personnel involved discussed with patient. Patient verbalized an understanding and agrees to proceed.       Zeinab Curtis Zoë Gonzalez MD  November 28, 2022  9:17 AM      Lawanda Lundborg, MD   11/28/2022

## 2022-11-29 VITALS
HEIGHT: 64 IN | OXYGEN SATURATION: 95 % | HEART RATE: 83 BPM | WEIGHT: 173.83 LBS | RESPIRATION RATE: 16 BRPM | SYSTOLIC BLOOD PRESSURE: 115 MMHG | BODY MASS INDEX: 29.68 KG/M2 | TEMPERATURE: 98.1 F | DIASTOLIC BLOOD PRESSURE: 72 MMHG

## 2022-11-29 PROBLEM — N99.3 VAGINAL VAULT PROLAPSE AFTER HYSTERECTOMY: Status: RESOLVED | Noted: 2022-11-28 | Resolved: 2022-11-29

## 2022-11-29 PROCEDURE — G0378 HOSPITAL OBSERVATION PER HR: HCPCS

## 2022-11-29 PROCEDURE — 6370000000 HC RX 637 (ALT 250 FOR IP): Performed by: OBSTETRICS & GYNECOLOGY

## 2022-11-29 PROCEDURE — 6360000002 HC RX W HCPCS: Performed by: OBSTETRICS & GYNECOLOGY

## 2022-11-29 PROCEDURE — 2580000003 HC RX 258: Performed by: OBSTETRICS & GYNECOLOGY

## 2022-11-29 RX ORDER — POLYETHYLENE GLYCOL 3350 17 G/17G
17 POWDER ORAL DAILY
Qty: 850 G | Refills: 1 | Status: SHIPPED | OUTPATIENT
Start: 2022-11-29 | End: 2022-12-29

## 2022-11-29 RX ORDER — PSYLLIUM SEED (WITH DEXTROSE)
1 POWDER (GRAM) ORAL 2 TIMES DAILY
Qty: 538 G | Refills: 1 | Status: SHIPPED | OUTPATIENT
Start: 2022-11-29 | End: 2023-01-10

## 2022-11-29 RX ORDER — OXYCODONE HYDROCHLORIDE AND ACETAMINOPHEN 5; 325 MG/1; MG/1
1 TABLET ORAL EVERY 6 HOURS PRN
Qty: 20 TABLET | Refills: 0 | Status: SHIPPED | OUTPATIENT
Start: 2022-11-29 | End: 2022-12-04

## 2022-11-29 RX ORDER — DOCUSATE SODIUM 100 MG/1
100 CAPSULE, LIQUID FILLED ORAL 2 TIMES DAILY
Qty: 90 CAPSULE | Refills: 0 | Status: SHIPPED | OUTPATIENT
Start: 2022-11-29 | End: 2023-01-13

## 2022-11-29 RX ORDER — IBUPROFEN 600 MG/1
600 TABLET ORAL EVERY 6 HOURS PRN
Qty: 56 TABLET | Refills: 0 | Status: SHIPPED | OUTPATIENT
Start: 2022-11-29 | End: 2022-12-13

## 2022-11-29 RX ADMIN — OXYCODONE 5 MG: 5 TABLET ORAL at 05:04

## 2022-11-29 RX ADMIN — OXYCODONE HYDROCHLORIDE 10 MG: 10 TABLET ORAL at 09:31

## 2022-11-29 RX ADMIN — SODIUM CHLORIDE: 9 INJECTION, SOLUTION INTRAVENOUS at 00:07

## 2022-11-29 RX ADMIN — KETOROLAC TROMETHAMINE 30 MG: 30 INJECTION, SOLUTION INTRAMUSCULAR; INTRAVENOUS at 00:01

## 2022-11-29 RX ADMIN — FLUOXETINE 20 MG: 20 CAPSULE ORAL at 09:27

## 2022-11-29 RX ADMIN — KETOROLAC TROMETHAMINE 30 MG: 30 INJECTION, SOLUTION INTRAMUSCULAR; INTRAVENOUS at 06:34

## 2022-11-29 ASSESSMENT — PAIN SCALES - GENERAL
PAINLEVEL_OUTOF10: 4
PAINLEVEL_OUTOF10: 5
PAINLEVEL_OUTOF10: 8
PAINLEVEL_OUTOF10: 7

## 2022-11-29 ASSESSMENT — PAIN DESCRIPTION - ORIENTATION
ORIENTATION: LOWER

## 2022-11-29 ASSESSMENT — PAIN DESCRIPTION - LOCATION
LOCATION: ABDOMEN;BACK
LOCATION: ABDOMEN;BACK;PERINEUM
LOCATION: PERINEUM

## 2022-11-29 ASSESSMENT — PAIN DESCRIPTION - DESCRIPTORS
DESCRIPTORS: BURNING;DISCOMFORT;HEAVINESS;STABBING
DESCRIPTORS: ACHING;CRAMPING;DISCOMFORT;HEAVINESS
DESCRIPTORS: ACHING;DISCOMFORT;HEAVINESS
DESCRIPTORS: CRAMPING;DISCOMFORT;PRESSURE

## 2022-11-29 NOTE — FLOWSHEET NOTE
Voiding trial successful. 300 ml of sterile water placed in bladder and 325 ml output. Patient tolerated well. Provider notified.

## 2022-11-29 NOTE — DISCHARGE INSTRUCTIONS
[address] [facility]   [phone]    Dr. Shubham Gan MD  [address] [place] [address] John AALIYAH Trevino  Phone [phone]  Fax: [phone]      PATIENT NAME: Miriam Holder  MEDICAL RECORD NUMBER:  [id number]  TODAY'S DATE: 2022       Discharge Instructions: Procedure(s):  VAGINAL VAULT SUSPENSION,ANTERIOR REPAIR, POSTERIOR REPAIR, RETROPUBIC SLIN (CPT®)  CYSTOSCOPY: 72696 (CPT®)       Activity -   Avoid strenuous physical activity and do not lift anything greater than 10 pounds (about the size of a gallon of milk) until your 6-week post-operative visit. This includes: no pushing and pulling as well as lifting (ie - vacuum). You should not drive for two weeks or until you are no longer taking narcotic medications (Percocet, Vicodin, etc). You should speak to your doctor about other limitations depending on what surgery you had done. You can:  Walk up/down steps   Go for walks   Ride as a passenger in the car   [address]:  You should discuss with your doctor when to return to work. Avoid sexual intercourse until your six-week post-operative check-up. Do not insert anything into the vagina or rectum for 6 weeks including tampons or suppositories   No pools or hot tubs for 6 weeks   It is normal to have a vaginal discharge with blood for up to 6 weeks after surgery. The amount of discharge should gradually decrease with time. Diet -   To decrease post-operative nausea, try to eat small frequent meals. Eat foods high in protein such as meat, fish, eggs, and dairy products. Eat foods with vitamin C such as citrus fruits. Consider taking a single multivitamin, which you can buy at any pharmacy. Drink lots of fluids. Bowels -  You will want to have a regular, soft daily bowel movement.  Upon discharge from the hospital you will be given prescriptions for Colace and Miralax with instructions to take it on a daily basis. If you are feeling constipated or if it has been more than a day since your last bowel movement, it is acceptable to try the following:  Prunes/apple/raisins or other high-fiber foods   Metamucil or Citrucel: one heaping tablespoon in 8oz of water twice per day  Milk of magnesia, to be taken at bedtime following the directions on the label   Be sure to drink at least 6-8 glasses of fluids per day. If you have problems with constipation and no bowel movement for 5-7 days after surgery and you are uncomfortable or concerned-- please call the office. AVOID the use of rectal suppositories or enemas. Medications -   After surgery you may resume all your normal medications. Please follow specific instructions for any kind of blood thinner. WHAT ABOUT PAIN MEDICATION? You will be sent home with strong pain medications containing small amounts of narcotic painkillers. Some things you may want to remember:  You should use naproxyn (Naprosyn), ibuprofen (Motrin or Advil), Acetominophen (Tylenol), or other non-steroidal anti-inflammatory medications for your pain in addition to the strong narcotic pain medications, which you should use \"as needed\"   Take your pain medications when your first begin feeling discomfort. Don't wait until your pain is intense to take your medications. When used as directed, you will not become \"addicted\" to the pain medications. The narcotic pain medications we send you home with may cause nausea or constipation. As your surgery heals, you may find that you feel better when you don't take those medications. If Tylenol or Motrin relieves the pain, use those medications instead. Don't drive while taking narcotic pain medications. Incision Care -   Showers (NOT tub baths) are preferred for the first 2 weeks after surgery. If you have an abdominal incision, it is ok for this to get wet.  If the incision appears dirty or caked, you may clean it with hydrogen peroxide on a cotton swab. You may have small plastic bandages called Steri-strips across the incision. There is no need to worry if these fall off. If they begin to curl at the edges, simply trim with scissors. If you have had vaginal surgery, showers (NOT tub baths) are preferred for the first 2 weeks after surgery. You may do sitz baths once or twice a day with warm water. A tablespoon of Epsom salts mixed in the water may help healing and decrease pain. DO NOT douche. Bladder Catheter -   It is very normal to go home from the hospital with a catheter in your bladder. The surgery your doctor performed tends to cause swelling around the opening to the bladder, making it difficult for you to pass urine. If this is the case, you will be given instructions in the hospital on the care of the catheter, and will be told when to come back to the office for catheter removal. It is not unusual for it to take a few weeks for your bladder to return to normal. The catheter may be plugged during the day. You will get the urge to void if the catheter is plugged. To drain your bladder, simply unplug the catheter and empty the catheter into the toilet. As the swelling decreases it is possible to urinate around the catheter, this is normal.  The nurse in the hospital will make sure you are comfortable with this prior to you going home. If you do go home with a catheter, please take Theracran (cranberry supplements), two tablets, twice per day, to help prevent urinary tract infections. A prescription should be given to you at the hospital, but these are also available over the counter. After the catheter is removed, try to urinate on a regular basis. A good way to do this is to try to urinate every 3-4 hours during the day. By urinating on a scheduled basis you may prevent bladder spasms. You do not have to wake up during the night to do this drill. WHAT SHOULD I WATCH OUT FOR?   There are several warning signs you should watch for:  FEVER GREATER THAN 100.4°F   SHAKING CHILLS   SEVERE PAIN  NAUSEA WITH VOMITING   PROBLEMS WITH YOUR INCISION SUCH AS REDNESS OR DISCHARGE   VAGINAL BLEEDING HEAVIER THAN WHAT WAS A HEAVY MENSTRUAL CYCLE   INCREASE IN VAGINAL DISCHARGE, ESPECIALLY WITH A STRONG ODOR   INABILITY TO URINATE     Should you have problems or questions call the office at: [phone]. If it is after normal business hours, the answering service will call Dr Alexx Trivedi for urgent issues. FOLLOW UP APPOINTMENTS  Upon leaving the hospital, you should call [phone] during normal business hours to schedule follow-up appointments.  You will likely need to be seen for the following:  Catheter removal in one week, if you go home with a bladder catheter (OR you may be instructed to remove your catheter at home in one week)   Two-week follow-up   Six-week follow-up   Three month follow-up

## 2022-11-29 NOTE — PLAN OF CARE
Problem: Discharge Planning  Goal: Discharge to home or other facility with appropriate resources  11/29/2022 0652 by Montana Bernard RN  Outcome: Progressing  11/28/2022 1708 by Destinee Lozano RN  Outcome: Progressing     Problem: ABCDS Injury Assessment  Goal: Absence of physical injury  11/29/2022 0652 by Montana Bernard RN  Outcome: Progressing  11/28/2022 1708 by Destinee Lozano RN  Outcome: Progressing     Problem: Pain  Goal: Verbalizes/displays adequate comfort level or baseline comfort level  11/29/2022 0652 by Montana Bernard RN  Outcome: Progressing  11/28/2022 1708 by Destinee Lozano RN  Outcome: Progressing     Problem: Neurosensory - Adult  Goal: Absence of seizures  11/29/2022 0652 by Montana Bernard RN  Outcome: Progressing  11/28/2022 1708 by Destinee Lozano RN  Outcome: Progressing  Goal: Remains free of injury related to seizures activity  11/29/2022 0652 by Montana Bernard RN  Outcome: Progressing  11/28/2022 1708 by Destinee Lozano RN  Outcome: Progressing  Goal: Achieves maximal functionality and self care  11/29/2022 0652 by Montana Bernard RN  Outcome: Progressing  11/28/2022 1708 by Destinee Lozano RN  Outcome: Progressing     Problem: Respiratory - Adult  Goal: Achieves optimal ventilation and oxygenation  11/29/2022 0652 by Montana Bernard RN  Outcome: Progressing  11/28/2022 1708 by Destinee Lozano RN  Outcome: Progressing     Problem: Cardiovascular - Adult  Goal: Maintains optimal cardiac output and hemodynamic stability  11/29/2022 0652 by Montana Bernard RN  Outcome: Progressing  11/28/2022 1708 by Destinee Lozano RN  Outcome: Progressing  Goal: Absence of cardiac dysrhythmias or at baseline  11/29/2022 0652 by Montana Bernard RN  Outcome: Progressing  11/28/2022 1708 by Destinee Lozano RN  Outcome: Progressing     Problem: Skin/Tissue Integrity - Adult  Goal: Skin integrity remains intact  11/29/2022 0652 by Montana Bernard RN  Outcome: Progressing  11/28/2022 1708 by Destinee Lozano RN  Outcome: Progressing  Goal: Incisions, wounds, or drain sites healing without S/S of infection  11/29/2022 9189 by David Willis RN  Outcome: Progressing  11/28/2022 1708 by Rakesh Christianson RN  Outcome: Progressing  Goal: Oral mucous membranes remain intact  11/29/2022 0652 by David Willis RN  Outcome: Progressing  11/28/2022 1708 by Rakesh Christianson RN  Outcome: Progressing     Problem: Musculoskeletal - Adult  Goal: Return mobility to safest level of function  11/29/2022 0652 by David Willis RN  Outcome: Progressing  11/28/2022 1708 by Rakesh Christianson RN  Outcome: Progressing  Goal: Maintain proper alignment of affected body part  11/29/2022 0652 by David Willis RN  Outcome: Progressing  11/28/2022 1708 by Rakesh Christianson RN  Outcome: Progressing  Goal: Return ADL status to a safe level of function  11/29/2022 0652 by David Willis RN  Outcome: Progressing  11/28/2022 1708 by Rakesh Christianson RN  Outcome: Progressing     Problem: Gastrointestinal - Adult  Goal: Minimal or absence of nausea and vomiting  11/29/2022 0652 by David Willis RN  Outcome: Progressing  11/28/2022 1708 by Rakesh Christianson RN  Outcome: Progressing  Goal: Maintains or returns to baseline bowel function  11/29/2022 0652 by David Willis RN  Outcome: Progressing  11/28/2022 1708 by Rakesh Christianson RN  Outcome: Progressing  Goal: Maintains adequate nutritional intake  11/29/2022 0652 by David Willis RN  Outcome: Progressing  11/28/2022 1708 by Rakesh Christianson RN  Outcome: Progressing  Goal: Establish and maintain optimal ostomy function  11/29/2022 0652 by David Willis RN  Outcome: Progressing  11/28/2022 1708 by Rakesh Christianson RN  Outcome: Progressing     Problem: Genitourinary - Adult  Goal: Absence of urinary retention  11/29/2022 0652 by David Willis RN  Outcome: Progressing  11/28/2022 1708 by Rakesh Christianson RN  Outcome: Progressing  Goal: Urinary catheter remains patent  11/29/2022 0652 by David Willis RN  Outcome: Progressing  11/28/2022 1708 by Rakesh Christianson RN  Outcome: Progressing     Problem: Infection - Adult  Goal: Absence of infection at discharge  11/29/2022 6562 by Maeve Hernández RN  Outcome: Progressing  11/28/2022 1708 by Dg Pitts RN  Outcome: Progressing  Goal: Absence of infection during hospitalization  11/29/2022 0652 by Maeve Hernández RN  Outcome: Progressing  11/28/2022 1708 by Dg Pitts RN  Outcome: Progressing  Goal: Absence of fever/infection during anticipated neutropenic period  11/29/2022 0652 by Maeve Hernández RN  Outcome: Progressing  11/28/2022 1708 by Dg Pitts RN  Outcome: Progressing     Problem: Metabolic/Fluid and Electrolytes - Adult  Goal: Electrolytes maintained within normal limits  11/29/2022 0652 by Maeve Hernández RN  Outcome: Progressing  11/28/2022 1708 by Dg Pitts RN  Outcome: Progressing  Goal: Hemodynamic stability and optimal renal function maintained  11/29/2022 0652 by Maeve Hernández RN  Outcome: Progressing  11/28/2022 1708 by Dg Pitts RN  Outcome: Progressing  Goal: Glucose maintained within prescribed range  11/29/2022 0652 by Maeve Hernández RN  Outcome: Progressing  11/28/2022 1708 by Dg Pitts RN  Outcome: Progressing     Problem: Hematologic - Adult  Goal: Maintains hematologic stability  11/29/2022 0652 by Maeve Hernández RN  Outcome: Progressing  11/28/2022 1708 by Dg Pitts RN  Outcome: Progressing

## 2022-11-29 NOTE — FLOWSHEET NOTE
Discharge teaching completed and forms signed by patient. Copy witnessed by RN and given to patient. Prescriptions given. Patient plans to follow-up with Provider as instructed. Patient verbalizes understanding of discharge instructions and denies further questions. Patient discharged in stable condition accompanied by daughter to private vehicle.

## 2022-11-29 NOTE — DISCHARGE SUMMARY
Hospital Discharge Summary      Patient ID: Tyron Rivas      Patient's PCP: SUSY Kamara CNP    Admit Date: 2022     Discharge Date: 2022  The patient was seen and examined on day of discharge and this discharge summary is in conjunction with any daily progress note from day of discharge. Admitting Physician: Adrian Marquez MD    Discharge Physician: Vearl Duverney, APRN - CNP     Admitted for No chief complaint on file. 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 Hospital Course:   POD 1. Vaginal packing removed  Passed voiding trial  Pain well managed with scheduled medications  Tolerating regular diet        Consults:     None        Disposition: home    Discharged Condition: Stable    Code Status: Prior    Activity: no lifting, Driving, or Strenuous exercise for 2 weeks    Diet: regular diet      Wound Care: none needed    SUBJECTIVE / Interval History:   Patient awake and sitting up in chair. Tolerating regular diet. She reports good pain management with medication. She will kathleen discharged home with her daughter who is present this morning. All discharge instructions reviewed    Exam:  TEMPERATURE:  Current - Temp: 98.1 °F (36.7 °C);  Max - Temp  Av.6 °F (36.4 °C)  Min: 97.1 °F (36.2 °C)  Max: 98.4 °F (36.9 °C)  RESPIRATIONS RANGE: Resp  Av.9  Min: 9  Max: 18  PULSE RANGE: Pulse  Av.4  Min: 71  Max: 101  BLOOD PRESSURE RANGE:  Systolic (12XVO), PWW:640 , Min:87 , OFQ:999   ; Diastolic (38KCA), PTS:49, Min:51, Max:70    PULSE OXIMETRY RANGE: SpO2  Av %  Min: 89 %  Max: 99 %  24HR INTAKE/OUTPUT:    Intake/Output Summary (Last 24 hours) at 2022 0835  Last data filed at 2022 0800  Gross per 24 hour   Intake 2871.16 ml   Output 1795 ml   Net 1076.16 ml      Wt Readings from Last 1 Encounters:   11/28/22 173 lb 13.3 oz (78.8 kg)     BMI Readings from Last 1 Encounters:   11/28/22 29.84 kg/m²         General appearance: No apparent distress, appears stated age and cooperative. HEENT Normal cephalic, atraumatic without obvious deformity. Pupils equal, round, and reactive to light. Extra ocular muscles intact. Conjunctivae/corneas clear. Neck: Supple, No jugular venous distention/bruits. Trachea midline without thyromegaly or adenopathy with full range of motion. Lungs: Clear to ascultation, bilaterally without Rales/Wheezes/Rhonchi with good respiratory effort. Heart: Regular rate and rhythm  Abdomen: Soft, non-tender   Extremities: No clubbing, cyanosis, or edema bilaterally. Full range of motion without deformity and normal gait intact. Skin: Skin color, texture, turgor normal.  No rashes or lesions. Neurologic: Alert and oriented X 3  Mental status: Alert, oriented, thought content appropriate      Labs: For convenience and continuity at follow-up the following most recent labs are provided:    CBC:   Lab Results   Component Value Date/Time    WBC 16.4 03/05/2014 05:32 AM    HGB 12.5 03/05/2014 05:32 AM    HCT 37.7 03/05/2014 05:32 AM     03/05/2014 05:32 AM       RENAL: No results found for: NA, K, CL, CO2, BUN, CREATININE        Discharge Medications:      Medication List        START taking these medications      docusate sodium 100 MG capsule  Commonly known as: COLACE  Take 1 capsule by mouth 2 times daily     Metamucil 48.57 % Powd  Generic drug: Psyllium  Take 1 Scoop by mouth 2 times daily     oxyCODONE-acetaminophen 5-325 MG per tablet  Commonly known as: Percocet  Take 1 tablet by mouth every 6 hours as needed for Pain for up to 5 days.      polyethylene glycol 17 GM/SCOOP Powd powder  Commonly known as: MIRALAX  Take 17 g by mouth daily            CHANGE how you take these medications      * ibuprofen 800 MG tablet  Commonly known as: ADVIL;MOTRIN  Take 1 tablet by mouth every 6 hours. What changed: Another medication with the same name was added. Make sure you understand how and when to take each. * ibuprofen 600 MG tablet  Commonly known as: ADVIL;MOTRIN  Take 1 tablet by mouth every 6 hours as needed for Pain  What changed: You were already taking a medication with the same name, and this prescription was added. Make sure you understand how and when to take each. * This list has 2 medication(s) that are the same as other medications prescribed for you. Read the directions carefully, and ask your doctor or other care provider to review them with you. CONTINUE taking these medications      FLUoxetine 20 MG capsule  Commonly known as: PROZAC               Where to Get Your Medications        These medications were sent to [facility] [address], [address] - F [phone] [address] [place] [address] [address] [place] [address]      Phone: [phone]   docusate sodium 100 MG capsule  ibuprofen 600 MG tablet  Metamucil 48.57 % Powd  oxyCODONE-acetaminophen 5-325 MG per tablet  polyethylene glycol 17 GM/SCOOP Powd powder         Future Appointments   Date Time Provider Chi Bearden   12/12/2022  9:45 AM Vearl Duverney, APRN - CNP [facility]      No follow-ups on file. Time Spent on discharge is more than 20 minutes in the examination, evaluation, counseling and review of medications and discharge plan. Signed:  Vearl Duverney, APRN - CNP   11/29/2022    The note was completed using EMR. Every effort was made to ensure accuracy; however, inadvertent computerized transcription errors may be present.

## 2022-12-12 ENCOUNTER — OFFICE VISIT (OUTPATIENT)
Dept: UROGYNECOLOGY | Age: 47
End: 2022-12-12

## 2022-12-12 VITALS
OXYGEN SATURATION: 98 % | SYSTOLIC BLOOD PRESSURE: 135 MMHG | TEMPERATURE: 98 F | DIASTOLIC BLOOD PRESSURE: 78 MMHG | RESPIRATION RATE: 14 BRPM | HEART RATE: 78 BPM

## 2022-12-12 DIAGNOSIS — Z09 POSTOP CHECK: Primary | ICD-10-CM

## 2022-12-12 PROCEDURE — 99024 POSTOP FOLLOW-UP VISIT: CPT | Performed by: NURSE PRACTITIONER

## 2022-12-12 NOTE — PROGRESS NOTES
12/12/2022       HPI:     Name: Oralia Castano  YOB: 1975    CC: Oralia Castano is a 49 y.o. female who is here for a 2 week post op evaluation. HPI: Recently underwent VAGINAL VAULT SUSPENSION,ANTERIOR REPAIR, POSTERIOR REPAIR, RETROPUBIC SLING  CYSTOSCOPY . Voiding difficulty: No  Initiating stream: No  Force stream: No  Lean forward to empty: No  Slow/intermittent stream: No  Unable to empty bladder: No  Incontinence: stress present prior to surgery  Urgency without incontinence: No   Frequency without incontinence: No   Bowel functions: normal   Pain Controlled: Yes    Past Medical History:   Past Medical History:   Diagnosis Date    Anxiety     Hyperlipidemia     DIET CONTROL NO MEDS    PMDD (premenstrual dysphoric disorder)     Seizure ([address]     X 2-last one as a teenager     Past Surgical History:   Past Surgical History:   Procedure Laterality Date    CERVIX SURGERY      \"freezing\"-LOCAL    CYSTOSCOPY N/A 11/28/2022    CYSTOSCOPY performed by Papi Marquis MD at [facility] ([address] [place] [address])  03/04/2014    VAGINAL HYSTERECTOMY, BILATERAL SAPINGECTOMY    LEEP  01/01/2013    VAGINA SURGERY N/A 11/28/2022    VAGINAL VAULT SUSPENSION,ANTERIOR REPAIR, POSTERIOR REPAIR, RETROPUBIC SLING performed by Papi Marquis MD at [facility]     Current Medications:  Current Outpatient Medications   Medication Sig Dispense Refill    ibuprofen (ADVIL;MOTRIN) 600 MG tablet Take 1 tablet by mouth every 6 hours as needed for Pain 56 tablet 0    Psyllium (METAMUCIL) 48.57 % POWD Take 1 Scoop by mouth 2 times daily 538 g 1    polyethylene glycol (MIRALAX) 17 GM/SCOOP POWD powder Take 17 g by mouth daily 850 g 1    docusate sodium (COLACE) 100 MG capsule Take 1 capsule by mouth 2 times daily 90 capsule 0    ibuprofen (ADVIL;MOTRIN) 800 MG tablet Take 1 tablet by mouth every 6 hours. 60 tablet 1    FLUoxetine (PROZAC) 20 MG capsule Take 20 mg by mouth daily.        No current facility-administered medications for this visit. Allergies: No Known Allergies  Social History:   Social History     Socioeconomic History    Marital status:      Spouse name: Not on file    Number of children: Not on file    Years of education: Not on file    Highest education level: Not on file   Occupational History    Not on file   Tobacco Use    Smoking status: Former     Packs/day: 0.50     Years: 5.00     Pack years: 2.50     Types: Cigarettes     Quit date: 10/19/2013     Years since quittin.1    Smokeless tobacco: Never   Vaping Use    Vaping Use: Former    Quit date: 2021    Substances: Nicotine, CBD, Flavoring   Substance and Sexual Activity    Alcohol use: Yes     Comment: OCCAS    Drug use: Not Currently     Types: Marijuana Erik Calamity)     Comment: LAST USED-10 YEARS AGO    Sexual activity: Not Currently   Other Topics Concern    Not on file   Social History Narrative    Not on file     Social Determinants of Health     Financial Resource Strain: Not on file   Food Insecurity: Not on file   Transportation Needs: Not on file   Physical Activity: Not on file   Stress: Not on file   Social Connections: Not on file   Intimate Partner Violence: Not on file   Housing Stability: Not on file     Family History:   Family History   Problem Relation Age of Onset    No Known Problems Mother     Other Father          Objective:     Vitals  Vitals:    22 1004   BP: 135/78   Pulse: 78   Resp: 14   Temp: 98 °F (36.7 °C)   SpO2: 98%     Physical Exam  Physical Exam  Vitals and nursing note reviewed. Constitutional:       Appearance: Normal appearance. HENT:      Head: Normocephalic. Eyes:      Conjunctiva/sclera: Conjunctivae normal.   Cardiovascular:      Rate and Rhythm: Normal rate. Pulmonary:      Effort: Pulmonary effort is normal.   Musculoskeletal:         General: Normal range of motion. Cervical back: Normal range of motion. Skin:     General: Skin is warm and dry. Neurological:      General: No focal deficit present. Mental Status: She is alert. Psychiatric:         Mood and Affect: Mood normal.         Behavior: Behavior normal.     All surgical incisions healing well. No erythema. No evidence of hematoma or abscess. No results found for this visit on 12/12/22. Assessnent/Plan:     Hannah Carvajal is a 49 y.o. female with   1. Postop check        Patient is doing well 2 weeks. Restrictions reviewed  Patient is to follow up in 4 weeks . No orders of the defined types were placed in this encounter. No orders of the defined types were placed in this encounter.       Asif Chaney, SUSY - CNP

## 2022-12-15 ENCOUNTER — TELEPHONE (OUTPATIENT)
Dept: UROGYNECOLOGY | Age: 47
End: 2022-12-15

## 2022-12-15 NOTE — TELEPHONE ENCOUNTER
Patient called and states she was \"pushing something\" and right after she \"felt pressure\" followed by some light bleeding. She wanted to know if this is normal.  Notified Kacey Mark NP who said light bleeding is ok. Advised patient if she is soaking a pad that she would need to call immediately, possibly ER. Patient states \"it was just a light amount on the pad. \" Advised patient to call if it gets worse and she is going to call with an update tomorrow and will make appt for tomorrow if worsens since she lives in [facility]. Allyn young.

## 2023-01-09 ENCOUNTER — OFFICE VISIT (OUTPATIENT)
Dept: UROGYNECOLOGY | Age: 48
End: 2023-01-09

## 2023-01-09 VITALS
TEMPERATURE: 98 F | DIASTOLIC BLOOD PRESSURE: 86 MMHG | SYSTOLIC BLOOD PRESSURE: 152 MMHG | RESPIRATION RATE: 16 BRPM | OXYGEN SATURATION: 99 % | HEART RATE: 60 BPM

## 2023-01-09 DIAGNOSIS — Z09 POSTOP CHECK: Primary | ICD-10-CM

## 2023-01-09 PROCEDURE — 99024 POSTOP FOLLOW-UP VISIT: CPT | Performed by: NURSE PRACTITIONER

## 2023-01-09 RX ORDER — ESTRADIOL 0.1 MG/G
0.25 CREAM VAGINAL DAILY
Qty: 30 G | Refills: 0 | Status: SHIPPED | OUTPATIENT
Start: 2023-01-09

## 2023-01-09 NOTE — LETTER
[facility]  [address] [place] [address]  Phone: [phone]  Fax: [phone]    SUSY Cano CNP        January 9, 2023     Patient: Rachael Hennessy   YOB: 1975   Date of Visit: 1/9/2023       To Whom It May Concern: It is my medical opinion that Rachael Hennessy may return to work on 1/10/23 with the following restrictions: lifting/carrying not to exceed 25 lbs. .    If you have any questions or concerns, please don't hesitate to call.     Sincerely,        SUSY Cano CNP

## 2023-01-09 NOTE — PROGRESS NOTES
1/9/2023       HPI:     Name: Hamlet Todd  YOB: 1975    CC: Hamlet Todd is a 49 y.o. female who is here for a 6 week post op evaluation. HPI: Recently underwent VAGINAL VAULT [address], POSTERIOR REPAIR, RETROPUBIC SLING CYSTOSCOPY on 11/28/22. Voiding difficulty: No  Initiating stream: No  Force stream: No  Lean forward to empty: No  Slow/intermittent stream: No  Unable to empty bladder: No  Incontinence: some stress incontinence  Urgency without incontinence: No   Frequency without incontinence: No   Bowel functions: normal   Pain Controlled: Yes    Past Medical History:   Past Medical History:   Diagnosis Date    Anxiety     Hyperlipidemia     DIET CONTROL NO MEDS    PMDD (premenstrual dysphoric disorder)     Seizure ([address]     X 2-last one as a teenager     Past Surgical History:   Past Surgical History:   Procedure Laterality Date    CERVIX SURGERY      \"freezing\"-LOCAL    CYSTOSCOPY N/A 11/28/2022    CYSTOSCOPY performed by Blue Abebe MD at [address])  03/04/2014    VAGINAL HYSTERECTOMY, BILATERAL SAPINGECTOMY    LEEP  01/01/2013    VAGINA SURGERY N/A 11/28/2022    VAGINAL VAULT SUSPENSION,ANTERIOR REPAIR, POSTERIOR REPAIR, RETROPUBIC SLING performed by Blue Abebe MD at [place] [address]     Current Medications:  Current Outpatient Medications   Medication Sig Dispense Refill    estradiol (ESTRACE VAGINAL) 0.1 MG/GM vaginal cream Place 0.25 g vaginally daily Apply 0.25g with fingertip to the vaginal opening every night at bedtime for 2 weeks, then decrease to twice weekly. 30 g 0    Psyllium (METAMUCIL) 48.57 % POWD Take 1 Scoop by mouth 2 times daily 538 g 1    docusate sodium (COLACE) 100 MG capsule Take 1 capsule by mouth 2 times daily 90 capsule 0    ibuprofen (ADVIL;MOTRIN) 800 MG tablet Take 1 tablet by mouth every 6 hours. 60 tablet 1    FLUoxetine (PROZAC) 20 MG capsule Take 20 mg by mouth daily.       ibuprofen (ADVIL;MOTRIN) 600 MG tablet Take 1 tablet by mouth every 6 hours as needed for Pain 56 tablet 0     No current facility-administered medications for this visit. Allergies: No Known Allergies  Social History:   Social History     Socioeconomic History    Marital status:      Spouse name: Not on file    Number of children: Not on file    Years of education: Not on file    Highest education level: Not on file   Occupational History    Not on file   Tobacco Use    Smoking status: Former     Packs/day: 0.50     Years: 5.00     Pack years: 2.50     Types: Cigarettes     Quit date: 10/19/2013     Years since quittin.2    Smokeless tobacco: Never   Vaping Use    Vaping Use: Former    Quit date: 2021    Substances: Nicotine, CBD, Flavoring   Substance and Sexual Activity    Alcohol use: Yes     Comment: OCCAS    Drug use: Not Currently     Types: Marijuana Claudia Melissa)     Comment: LAST USED-10 YEARS AGO    Sexual activity: Not Currently   Other Topics Concern    Not on file   Social History Narrative    Not on file     Social Determinants of Health     Financial Resource Strain: Not on file   Food Insecurity: Not on file   Transportation Needs: Not on file   Physical Activity: Not on file   Stress: Not on file   Social Connections: Not on file   Intimate Partner Violence: Not on file   Housing Stability: Not on file     Family History:   Family History   Problem Relation Age of Onset    No Known Problems Mother     Other Father          Objective:     Vitals  Vitals:    23 0939 23 0958   BP: (!) 156/99 (!) 152/86   Pulse: 60    Resp: 16    Temp: 98 °F (36.7 °C)    SpO2: 99%      Patient states her blood pressure has been high within the past year due to changes in her personal life. Physical Exam  Physical Exam  Vitals and nursing note reviewed. Constitutional:       Appearance: Normal appearance. HENT:      Head: Normocephalic.    Eyes:      Conjunctiva/sclera: Conjunctivae normal. Cardiovascular:      Rate and Rhythm: Normal rate. Pulmonary:      Effort: Pulmonary effort is normal.   Musculoskeletal:         General: Normal range of motion. Cervical back: Normal range of motion. Skin:     General: Skin is warm and dry. Neurological:      General: No focal deficit present. Mental Status: She is alert. Psychiatric:         Mood and Affect: Mood normal.         Behavior: Behavior normal.     All surgical incisions healing well. No erythema. No evidence of hematoma or abscess. Perineum is red and irritated    No results found for this visit on 01/09/23. Assessnent/Plan:     Jailene Morgan is a 49 y.o. female with   1. Postop check      Patient is doing well 6 weeks. Restrictions reviewed  Ok to return to work with lifting restrictions. Note given  Patient is to follow up in 6 weeks. No orders of the defined types were placed in this encounter. Orders Placed This Encounter   Medications    estradiol (ESTRACE VAGINAL) 0.1 MG/GM vaginal cream     Sig: Place 0.25 g vaginally daily Apply 0.25g with fingertip to the vaginal opening every night at bedtime for 2 weeks, then decrease to twice weekly.      Dispense:  30 g     Refill:  [address] [facility], APRN - CNP

## 2023-02-20 ENCOUNTER — OFFICE VISIT (OUTPATIENT)
Dept: UROGYNECOLOGY | Age: 48
End: 2023-02-20

## 2023-02-20 ENCOUNTER — HOSPITAL ENCOUNTER (EMERGENCY)
Age: 48
Discharge: HOME | End: 2023-02-20
Attending: EMERGENCY MEDICINE
Payer: MEDICAID

## 2023-02-20 ENCOUNTER — APPOINTMENT (OUTPATIENT)
Dept: GENERAL RADIOLOGY | Age: 48
End: 2023-02-20
Payer: MEDICAID

## 2023-02-20 VITALS
HEIGHT: 64 IN | SYSTOLIC BLOOD PRESSURE: 151 MMHG | WEIGHT: 176 LBS | BODY MASS INDEX: 30.05 KG/M2 | TEMPERATURE: 98.5 F | RESPIRATION RATE: 14 BRPM | OXYGEN SATURATION: 98 % | HEART RATE: 76 BPM | DIASTOLIC BLOOD PRESSURE: 76 MMHG

## 2023-02-20 VITALS
DIASTOLIC BLOOD PRESSURE: 104 MMHG | HEART RATE: 68 BPM | SYSTOLIC BLOOD PRESSURE: 154 MMHG | OXYGEN SATURATION: 99 % | TEMPERATURE: 98 F | RESPIRATION RATE: 14 BRPM

## 2023-02-20 DIAGNOSIS — R03.0 ELEVATED BLOOD PRESSURE READING: ICD-10-CM

## 2023-02-20 DIAGNOSIS — R07.9 CHEST PAIN, UNSPECIFIED TYPE: Primary | ICD-10-CM

## 2023-02-20 DIAGNOSIS — Z09 POSTOP CHECK: Primary | ICD-10-CM

## 2023-02-20 LAB
ANION GAP SERPL CALCULATED.3IONS-SCNC: 10 MMOL/L (ref 3–16)
BACTERIA: ABNORMAL /HPF
BASOPHILS ABSOLUTE: 0.1 K/UL (ref 0–0.2)
BASOPHILS RELATIVE PERCENT: 0.7 %
BILIRUBIN URINE: NEGATIVE
BLOOD, URINE: NEGATIVE
BUN BLDV-MCNC: 15 MG/DL (ref 7–20)
CALCIUM SERPL-MCNC: 9.3 MG/DL (ref 8.3–10.6)
CHLORIDE BLD-SCNC: 103 MMOL/L (ref 99–110)
CLARITY: CLEAR
CO2: 25 MMOL/L (ref 21–32)
COLOR: YELLOW
CREAT SERPL-MCNC: 0.7 MG/DL (ref 0.6–1.1)
EKG ATRIAL RATE: 69 BPM
EKG DIAGNOSIS: NORMAL
EKG P AXIS: 57 DEGREES
EKG P-R INTERVAL: 180 MS
EKG Q-T INTERVAL: 384 MS
EKG QRS DURATION: 90 MS
EKG QTC CALCULATION (BAZETT): 411 MS
EKG R AXIS: 34 DEGREES
EKG T AXIS: 50 DEGREES
EKG VENTRICULAR RATE: 69 BPM
EOSINOPHILS ABSOLUTE: 0.3 K/UL (ref 0–0.6)
EOSINOPHILS RELATIVE PERCENT: 3.6 %
EPITHELIAL CELLS, UA: ABNORMAL /HPF (ref 0–5)
GFR SERPL CREATININE-BSD FRML MDRD: >60 ML/MIN/{1.73_M2}
GLUCOSE BLD-MCNC: 100 MG/DL (ref 70–99)
GLUCOSE URINE: NEGATIVE MG/DL
HCT VFR BLD CALC: 39.2 % (ref 36–48)
HEMOGLOBIN: 13.4 G/DL (ref 12–16)
KETONES, URINE: NEGATIVE MG/DL
LEUKOCYTE ESTERASE, URINE: ABNORMAL
LYMPHOCYTES ABSOLUTE: 2.4 K/UL (ref 1–5.1)
LYMPHOCYTES RELATIVE PERCENT: 31.9 %
MCH RBC QN AUTO: 28.3 PG (ref 26–34)
MCHC RBC AUTO-ENTMCNC: 34.1 G/DL (ref 31–36)
MCV RBC AUTO: 82.8 FL (ref 80–100)
MICROSCOPIC EXAMINATION: YES
MONOCYTES ABSOLUTE: 0.5 K/UL (ref 0–1.3)
MONOCYTES RELATIVE PERCENT: 6.1 %
NEUTROPHILS ABSOLUTE: 4.3 K/UL (ref 1.7–7.7)
NEUTROPHILS RELATIVE PERCENT: 57.7 %
NITRITE, URINE: NEGATIVE
PDW BLD-RTO: 14.8 % (ref 12.4–15.4)
PH UA: 7 (ref 5–8)
PLATELET # BLD: 282 K/UL (ref 135–450)
PMV BLD AUTO: 7.6 FL (ref 5–10.5)
POTASSIUM REFLEX MAGNESIUM: 4 MMOL/L (ref 3.5–5.1)
PROTEIN UA: NEGATIVE MG/DL
RBC # BLD: 4.73 M/UL (ref 4–5.2)
RBC UA: ABNORMAL /HPF (ref 0–4)
SODIUM BLD-SCNC: 138 MMOL/L (ref 136–145)
SPECIFIC GRAVITY UA: 1.02 (ref 1–1.03)
TROPONIN: <0.01 NG/ML
URINE REFLEX TO CULTURE: ABNORMAL
URINE TYPE: ABNORMAL
UROBILINOGEN, URINE: 0.2 E.U./DL
WBC # BLD: 7.4 K/UL (ref 4–11)
WBC UA: ABNORMAL /HPF (ref 0–5)

## 2023-02-20 PROCEDURE — 81001 URINALYSIS AUTO W/SCOPE: CPT

## 2023-02-20 PROCEDURE — 80048 BASIC METABOLIC PNL TOTAL CA: CPT

## 2023-02-20 PROCEDURE — 93005 ELECTROCARDIOGRAM TRACING: CPT | Performed by: EMERGENCY MEDICINE

## 2023-02-20 PROCEDURE — 99284 EMERGENCY DEPT VISIT MOD MDM: CPT

## 2023-02-20 PROCEDURE — 99024 POSTOP FOLLOW-UP VISIT: CPT | Performed by: NURSE PRACTITIONER

## 2023-02-20 PROCEDURE — 85025 COMPLETE CBC W/AUTO DIFF WBC: CPT

## 2023-02-20 PROCEDURE — 71046 X-RAY EXAM CHEST 2 VIEWS: CPT

## 2023-02-20 PROCEDURE — 84484 ASSAY OF TROPONIN QUANT: CPT

## 2023-02-20 RX ORDER — BUPROPION HYDROCHLORIDE 150 MG/1
TABLET ORAL
COMMUNITY
Start: 2022-11-16

## 2023-02-20 RX ORDER — FLUOXETINE HYDROCHLORIDE 40 MG/1
CAPSULE ORAL
COMMUNITY
Start: 2022-11-16

## 2023-02-20 NOTE — ED PROVIDER NOTES
[address] [facility] [address] ENCOUNTER          ATTENDING PHYSICIAN NOTE       Date of evaluation: 2/20/2023    Chief Complaint     Hypertension and Chest Pain (Was at post op apt and sent here for htn and chest tightness )      History of Present Illness     Gretchen Berrios is a 49 y.o. female who presents to the emergency department with elevated blood pressure, chest discomfort, and bilateral hand tingling. Patient underwent a pelvic reconstructive surgery approximately 2 weeks ago and was at a postoperative appointment today when it was noted her blood pressure was approximately 150/100. She endorsed some occasional substernal chest tightness, exertional shortness of breath, and bilateral hand tingling to the provider she was seeing there and was advised to come to the emergency department. Patient says that her symptoms have been ongoing ever since her surgery. They are not clearly associated with exertion. She has a chronic cough that is worse in the morning, is not new or different. Has no fevers. Has no lower extremity swelling. Says that she often feels anxious, mostly related to issues surrounding her 's dementia. ASSESSMENT / PLAN  (MEDICAL DECISION MAKING)     INITIAL VITALS: BP: (!) 183/104, Temp: 98.5 °F (36.9 °C), Heart Rate: 84, Resp: 19, SpO2: 100 %      Gretchen Berrios is a 49 y.o. female who presented to the emergency department with elevated blood pressure, chest tightness, shortness of breath, and bilateral hand tingling. On presentation the patient was afebrile, hemodynamically stable, in no acute distress. Heart lungs are clear to auscultation and her neurologic exam was grossly nonfocal and nonlateralizing. Patient's work-up was notable for negative troponin, no ischemic changes on EKG, negative chest x-ray. No leukocytosis or anemia noted, renal function and electrolytes were within normal limits.   UA had 1+ bacteria and small leukocyte esterase, patient did not endorse any symptoms consistent with a urinary tract infection and so this was not felt to represent true UTI. All the patient's blood pressure was elevated when she arrived at 183/104, she was asymptomatic at that time and her blood pressure normalized over the course of her visit with no intervention. Patient does not have an established diagnosis of hypertension, given the normalization I suspect she may suffer from some component of whitecoat hypertension and therefore I have decided not to initiate any antihypertensive therapy during her visit today. Instead, I have advised her to follow-up with her PCP. Patient verbalized her understanding and agreement with this plan as well as return precautions and she was discharged in stable condition. Medical Decision Making  Amount and/or Complexity of Data Reviewed  External Data Reviewed: ECG. Labs: ordered. Decision-making details documented in ED Course. Radiology: ordered. Decision-making details documented in ED Course. Clinical Impression     1. Chest pain, unspecified type    2. Elevated blood pressure reading        Disposition     PATIENT REFERRED TO:  Catina Hugo APRN - CNP  [address]  [address] [facility]  [phone]    Call in 3 days      DISCHARGE MEDICATIONS:  New Prescriptions    No medications on file       DISPOSITION Decision To Discharge 02/20/2023 01:46:01 PM        Diagnostic Results and Other Data       RADIOLOGY:  XR CHEST (2 VW)   Final Result      Clear lungs. Normal cardiomediastinal silhouette.                 LABS:   Results for orders placed or performed during the hospital encounter of 02/20/23   BMP w/ Reflex to MG   Result Value Ref Range    Sodium 138 136 - 145 mmol/L    Potassium reflex Magnesium 4.0 3.5 - 5.1 mmol/L    Chloride 103 99 - 110 mmol/L    CO2 25 21 - 32 mmol/L    Anion Gap 10 3 - 16    Glucose 100 (H) 70 - 99 mg/dL    BUN 15 7 - 20 mg/dL    Creatinine 0.7 0.6 - 1.1 mg/dL    Est, Glom Filt Rate >60 >60    Calcium 9.3 8.3 - 10.6 mg/dL   Troponin   Result Value Ref Range    Troponin <0.01 <0.01 ng/mL   CBC with Auto Differential   Result Value Ref Range    WBC 7.4 4.0 - 11.0 K/uL    RBC 4.73 4.00 - 5.20 M/uL    Hemoglobin 13.4 12.0 - 16.0 g/dL    Hematocrit 39.2 36.0 - 48.0 %    MCV 82.8 80.0 - 100.0 fL    MCH 28.3 26.0 - 34.0 pg    MCHC 34.1 31.0 - 36.0 g/dL    RDW 14.8 12.4 - 15.4 %    Platelets 019 067 - 449 K/uL    MPV 7.6 5.0 - 10.5 fL    Neutrophils % 57.7 %    Lymphocytes % 31.9 %    Monocytes % 6.1 %    Eosinophils % 3.6 %    Basophils % 0.7 %    Neutrophils Absolute 4.3 1.7 - 7.7 K/uL    Lymphocytes Absolute 2.4 1.0 - 5.1 K/uL    Monocytes Absolute 0.5 0.0 - 1.3 K/uL    Eosinophils Absolute 0.3 0.0 - 0.6 K/uL    Basophils Absolute 0.1 0.0 - 0.2 K/uL   Urinalysis with Reflex to Culture    Specimen: Urine   Result Value Ref Range    Color, UA Yellow Straw/Yellow    Clarity, UA Clear Clear    Glucose, Ur Negative Negative mg/dL    Bilirubin Urine Negative Negative    Ketones, Urine Negative Negative mg/dL    Specific Gravity, UA 1.020 1.005 - 1.030    Blood, Urine Negative Negative    pH, UA 7.0 5.0 - 8.0    Protein, UA Negative Negative mg/dL    Urobilinogen, Urine 0.2 <2.0 E.U./dL    Nitrite, Urine Negative Negative    Leukocyte Esterase, Urine SMALL (A) Negative    Microscopic Examination YES     Urine Type NotGiven     Urine Reflex to Culture Not Indicated    Microscopic Urinalysis   Result Value Ref Range    WBC, UA 3-5 0 - 5 /HPF    RBC, UA 0-2 0 - 4 /HPF    Epithelial Cells, UA 2-5 0 - 5 /HPF    Bacteria, UA 1+ (A) None Seen /HPF     EKG   Sinus rhythm at 69 bpm with normal axis and normal intervals. T wave inversions in lead V1. No signs of acute ischemia or strain. No prior EKGs available for comparison. ED BEDSIDE ULTRASOUND:  No results found.     MOST RECENT VITALS:  BP: (!) 148/87,Temp: 98.5 °F (36.9 °C), Heart Rate: 78, Resp: 15, SpO2: 99 %     Procedures None    ED Course     Nursing Notes, Past Medical Hx, Past Surgical Hx, Social Hx,Allergies, and Family Hx were reviewed. The patient was given the following medications:  No orders of the defined types were placed in this encounter. CONSULTS:  None    Review of Systems     Review of Systems    Pertinent positive and negative findings as documented in the HPI, otherwise other systems were reviewed and were negative. Past Medical, Surgical, Family, and Social History     She has a past medical history of Anxiety, Hyperlipidemia, PMDD (premenstrual dysphoric disorder), and Seizure ([facility] [address] She has a past surgical history that includes Cervix surgery; LEEP (01/01/2013); Hysterectomy (03/04/2014); Vagina surgery (N/A, 11/28/2022); and Cystoscopy (N/A, 11/28/2022). Her family history includes No Known Problems in her mother; Other in her father. She reports that she quit smoking about 9 years ago. Her smoking use included cigarettes. She has a 2.50 pack-year smoking history. She has never used smokeless tobacco. She reports current alcohol use. She reports that she does not currently use drugs after having used the following drugs: Marijuana Adela Julián). Medications     Previous Medications    BUPROPION (WELLBUTRIN XL) 150 MG EXTENDED RELEASE TABLET        ESTRADIOL (ESTRACE VAGINAL) 0.1 MG/GM VAGINAL CREAM    Place 0.25 g vaginally daily Apply 0.25g with fingertip to the vaginal opening every night at bedtime for 2 weeks, then decrease to twice weekly. FLUOXETINE (PROZAC) 20 MG CAPSULE    Take 20 mg by mouth daily. FLUOXETINE (PROZAC) 40 MG CAPSULE        IBUPROFEN (ADVIL;MOTRIN) 600 MG TABLET    Take 1 tablet by mouth every 6 hours as needed for Pain    IBUPROFEN (ADVIL;MOTRIN) 800 MG TABLET    Take 1 tablet by mouth every 6 hours. Allergies     She has No Known Allergies.     Physical Exam     INITIAL VITALS: BP: (!) 183/104, Temp: 98.5 °F (36.9 °C), Heart Rate: 84, Resp: 19, SpO2: 100 % Physical Exam    General: Well developed and well nourished. No acute distress. HEENT: NCAT, PERRL, moist mucous membranes  Neck: Trachea midline, neck supple with FROM  Heart: Regular rate and rhythm. No murmurs, gallops, or rubs appreciated. Lungs: Clear to auscultation in all fields bilaterally. Normal effort. Abd: Nondistended, no signs of trauma. No tenderness to palpation, guarding, or rebound. MSK: No obvious deformities. Range of motion grossly intact. Extremities: No cyanosis or edema. Peripheral pulses intact. Skin: No rashes, abrasions, contusions, or lacerations noted. Neuro: Alert and oriented, moves all extremities spontaneously. No gross motor or sensory deficits. Psych: Mood and affect appropriate.  Thought process and content normal.     Artemio Braun MD  02/20/23 0522

## 2023-02-20 NOTE — DISCHARGE INSTRUCTIONS
Your EKG and troponin did not show any sign of heart damage today. Your chest x ray was normal.    You should discuss further evaluation of your elevated blood pressure with your primary care provider.  Diagnosing and managing chronic hypertension is important, but not something that we are equipped to do in the emergency department.    Follow up with your physician or clinic within the next 2-3 days. Return to the ED immediately if you have worse or continued chest pain, shortness of breath, nausea, sweating during chest pain, trouble breathing, chest pain with exertion (climbing stairs or walking for example), or any other concerns.

## 2023-02-20 NOTE — PROGRESS NOTES
2/20/2023     HPI:     Name: Pat Sauceda  YOB: 1975    CC: Pat Sauceda is a 49 y.o. female who is here for a 12 week post op evaluation. HPI: Recently underwent VAGINAL VAULT [address], POSTERIOR REPAIR, RETROPUBIC SLING CYSTOSCOPY on 11/28/22. Voiding difficulty: No  Initiating stream: No  Force stream: No  Lean forward to empty: No  Slow/intermittent stream: No  Unable to empty bladder: No  Incontinence: urge present prior to surgery - rare  Urgency without incontinence: No   Frequency without incontinence: No   Bowel functions: normal   Pain Controlled: Yes    Past Medical History:   Past Medical History:   Diagnosis Date    Anxiety     Hyperlipidemia     DIET CONTROL NO MEDS    PMDD (premenstrual dysphoric disorder)     Seizure ([address]     X 2-last one as a teenager     Past Surgical History:   Past Surgical History:   Procedure Laterality Date    CERVIX SURGERY      \"freezing\"-LOCAL    CYSTOSCOPY N/A 11/28/2022    CYSTOSCOPY performed by Anita Pineda MD at [address])  03/04/2014    VAGINAL HYSTERECTOMY, BILATERAL SAPINGECTOMY    LEEP  01/01/2013    VAGINA SURGERY N/A 11/28/2022    VAGINAL VAULT SUSPENSION,ANTERIOR REPAIR, POSTERIOR REPAIR, RETROPUBIC SLING performed by Anita Pineda MD at [place] [address]     Current Medications:  Current Outpatient Medications   Medication Sig Dispense Refill    buPROPion (WELLBUTRIN XL) 150 MG extended release tablet       FLUoxetine (PROZAC) 40 MG capsule       estradiol (ESTRACE VAGINAL) 0.1 MG/GM vaginal cream Place 0.25 g vaginally daily Apply 0.25g with fingertip to the vaginal opening every night at bedtime for 2 weeks, then decrease to twice weekly. 30 g 0    ibuprofen (ADVIL;MOTRIN) 600 MG tablet Take 1 tablet by mouth every 6 hours as needed for Pain 56 tablet 0    ibuprofen (ADVIL;MOTRIN) 800 MG tablet Take 1 tablet by mouth every 6 hours.  (Patient not taking: Reported on 2023) 60 tablet 1    FLUoxetine (PROZAC) 20 MG capsule Take 20 mg by mouth daily. (Patient not taking: Reported on 2023)       No current facility-administered medications for this visit. Allergies: No Known Allergies  Social History:   Social History     Socioeconomic History    Marital status:      Spouse name: Not on file    Number of children: Not on file    Years of education: Not on file    Highest education level: Not on file   Occupational History    Not on file   Tobacco Use    Smoking status: Former     Packs/day: 0.50     Years: 5.00     Pack years: 2.50     Types: Cigarettes     Quit date: 10/19/2013     Years since quittin.3    Smokeless tobacco: Never   Vaping Use    Vaping Use: Former    Quit date: 2021    Substances: Nicotine, CBD, Flavoring   Substance and Sexual Activity    Alcohol use: Yes     Comment: OCCAS    Drug use: Not Currently     Types: Marijuana Luis Manuel Dola)     Comment: LAST USED-10 YEARS AGO    Sexual activity: Not Currently   Other Topics Concern    Not on file   Social History Narrative    Not on file     Social Determinants of Health     Financial Resource Strain: Not on file   Food Insecurity: Not on file   Transportation Needs: Not on file   Physical Activity: Not on file   Stress: Not on file   Social Connections: Not on file   Intimate Partner Violence: Not on file   Housing Stability: Not on file     Family History:   Family History   Problem Relation Age of Onset    No Known Problems Mother     Other Father          Objective:     Vitals  Vitals:    23 0912 23 0913   BP: (!) 161/98 (!) 154/104   Pulse: 68    Resp: 14    Temp: 98 °F (36.7 °C)    SpO2: 99%      Physical Exam  Physical Exam  Vitals and nursing note reviewed. Constitutional:       Appearance: Normal appearance. HENT:      Head: Normocephalic. Eyes:      Conjunctiva/sclera: Conjunctivae normal.   Cardiovascular:      Rate and Rhythm: Normal rate.    Pulmonary:      Effort: Pulmonary effort is normal.   Musculoskeletal:         General: Normal range of motion. Cervical back: Normal range of motion. Skin:     General: Skin is warm and dry. Neurological:      General: No focal deficit present. Mental Status: She is alert. Psychiatric:         Mood and Affect: Mood normal.         Behavior: Behavior normal.     All surgical incisions healing well. No erythema. No evidence of hematoma or abscess. Vault sutures palpable      No results found for this visit on 02/20/23. Assessment/Plan:     Sammy Cota is a 49 y.o. female with   1. Postop check      Patient is post op 12 weeks. Surgically doing well  However patient BP is elevated and she admits to having chest pain and tingling in her fingers for the past day. She will go to [facility] ER now, I did call ER. SUSY Barreto - CNP      No orders of the defined types were placed in this encounter. No orders of the defined types were placed in this encounter.       SUSY Barreto - CNP

## (undated) DEVICE — 1016 S-DRAPE IRRIG POUCH 10/BOX: Brand: STERI-DRAPE™

## (undated) DEVICE — SOLUTION IV IRRIG POUR BRL 0.9% SODIUM CHL 2F7124

## (undated) DEVICE — CATHETER F BLLN 5CC 16FR 2 W HYDRGEL COAT LESS TRAUM LUB

## (undated) DEVICE — SKIN MARKER REGULAR TIP WITH RULER CAP AND LABELS: Brand: DEVON

## (undated) DEVICE — SYRINGE MED 10ML SLIP TIP BLNT FILL AND LUERLOCK DISP

## (undated) DEVICE — SYRINGE MED 10ML LUERLOCK TIP W/O SFTY DISP

## (undated) DEVICE — ELECTRODE PT RET AD L9FT HI MOIST COND ADH HYDRGEL CORDED

## (undated) DEVICE — SUTURE VCRL + SZ 2-0 L18IN ABSRB VLT L26MM SH 1/2 CIR VCP775D

## (undated) DEVICE — SUTURE VCRL + SZ 4-0 L27IN ABSRB WHT FS-2 3/8 CIR REV CUT VCP422H

## (undated) DEVICE — SUTURE PROL SZ 2-0 L30IN NONABSORBABLE BLU L26MM SH 1/2 CIR 8833H

## (undated) DEVICE — LEGGINGS, PAIR, CLEAR, STERILE: Brand: MEDLINE

## (undated) DEVICE — TUBING, SUCTION, 1/4" X 12', STRAIGHT: Brand: MEDLINE

## (undated) DEVICE — SOLUTION IV IRRIG WATER 1000ML POUR BRL 2F7114

## (undated) DEVICE — SYRINGE MED 10ML TRNSLUC BRL PLUNG BLK MRK POLYPR CTRL

## (undated) DEVICE — BAG DRAINAGE 2 LT STRL ANRFLX LF

## (undated) DEVICE — CATHETER URETH 18FR 2CC BLLN SIL ELASTMR F 2 W BARDX

## (undated) DEVICE — SUTURE VCRL + SZ 0 L18IN ABSRB VLT L26MM CT-2 1/2 CIR VCP727D

## (undated) DEVICE — SUTURE VCRL + SZ 2-0 L18IN ABSRB CLR TIE POLYGLACTIN 910 VCP111G

## (undated) DEVICE — SUTURE VCRL + SZ 2-0 L27IN ABSRB CLR CT-1 1/2 CIR TAPERCUT VCP259H

## (undated) DEVICE — SOLUTION SCRB 4OZ 10% POVIDONE IOD ANTIMIC BTL

## (undated) DEVICE — NEEDLE ENDOSCP 1ML SYR CA HA SIDEKCK RIG INJ COAPTITE

## (undated) DEVICE — SUTURE VCRL + SZ 4-0 L27IN ABSRB UD PS-2 3/8 CIR REV CUT VCP426H

## (undated) DEVICE — CYSTOSCOPY: Brand: MEDLINE INDUSTRIES, INC.

## (undated) DEVICE — BANDAGE GZ W6INXL162IN UNIV NONADHESIVE WND GEN USE KERLIX

## (undated) DEVICE — DRAPE,UNDERBUTTOCKS,PCH,STERILE: Brand: MEDLINE

## (undated) DEVICE — Y-TYPE TUR/BLADDER IRRIGATION SET, REGULATING CLAMP

## (undated) DEVICE — GOWN SIRUS NONREIN XL W/TWL: Brand: MEDLINE INDUSTRIES, INC.

## (undated) DEVICE — SOLUTION IRRIG 3000ML STRL H2O USP UROMATIC PLAS CONT

## (undated) DEVICE — TAPE DRESSING 36YDX.25IN COTTON TWL

## (undated) DEVICE — SOLUTION PREP POVIDONE IOD FOR SKIN MUCOUS MEM PRIOR TO

## (undated) DEVICE — RETRACTOR SURG W18.3XL32.5CM PLAS SELF RET W/ 2 CATH CLP

## (undated) DEVICE — STRIP,CLOSURE,WOUND,MEDI-STRIP,1/4X3: Brand: MEDLINE

## (undated) DEVICE — CYSTO/BLADDER IRRIGATION SET, REGULATING CLAMP

## (undated) DEVICE — HOOK RETRCT L5MM E SHRP SELF RET SYS LONE STAR

## (undated) DEVICE — PAD,ABDOMINAL,8"X7.5",STERILE,LF,1/PK: Brand: MEDLINE

## (undated) DEVICE — MERCY HEALTH WEST TURNOVER: Brand: MEDLINE INDUSTRIES, INC.

## (undated) DEVICE — NEEDLE HYPO 23GA L1.5IN TURQ POLYPR HUB S STL THN WALL IM

## (undated) DEVICE — GAUZE,SPONGE,4"X4",16PLY,XRAY,STRL,LF: Brand: MEDLINE

## (undated) DEVICE — SPONGE,LAP,4"X18",XR,ST,5/PK,40PK/CS: Brand: MEDLINE INDUSTRIES, INC.

## (undated) DEVICE — 3M™ STERI-STRIP™ COMPOUND BENZOIN TINCTURE 40 BAGS/CARTON 4 CARTONS/CASE C1544: Brand: 3M™ STERI-STRIP™

## (undated) DEVICE — PREMIUM DRY TRAY LF: Brand: MEDLINE INDUSTRIES, INC.

## (undated) DEVICE — BASIC SINGLE BASIN 1-LF: Brand: MEDLINE INDUSTRIES, INC.

## (undated) DEVICE — MAJOR SET UP: Brand: MEDLINE INDUSTRIES, INC.

## (undated) DEVICE — DRAINBAG,ANTI-REFLUX TOWER,L/F,2000ML,LL: Brand: MEDLINE

## (undated) DEVICE — GLOVE SURG SZ 7.5 L11.73IN FNGR THK9.8MIL STRW LTX POLYMER

## (undated) DEVICE — DRAPE LAP 104X35X124IN BLU CTRL + FAB REINF ABD FEN

## (undated) DEVICE — SOLUTION IRRIGATION STRL H2O 1000 ML UROMATIC CONTAINER

## (undated) DEVICE — TOTAL TRAY, DB, 100% SILI FOLEY, 16FR 10: Brand: MEDLINE

## (undated) DEVICE — SUTURE VCRL + SZ 3-0 L27IN ABSRB UD L26MM SH 1/2 CIR VCP416H

## (undated) DEVICE — SUTURE ETHBND EXCEL SZ 2-0 L36IN NONABSORBABLE GRN L26MM SH X523H

## (undated) DEVICE — GAUZE,PACKING STRIP,IODOFORM,2"X5YD,STRL: Brand: CURAD

## (undated) DEVICE — HYPODERMIC SAFETY NEEDLE: Brand: MAGELLAN

## (undated) DEVICE — SYRINGE 20ML LL S/C 50